# Patient Record
Sex: MALE | Race: WHITE | NOT HISPANIC OR LATINO | Employment: FULL TIME | URBAN - METROPOLITAN AREA
[De-identification: names, ages, dates, MRNs, and addresses within clinical notes are randomized per-mention and may not be internally consistent; named-entity substitution may affect disease eponyms.]

---

## 2023-10-13 ENCOUNTER — ANCILLARY PROCEDURE (OUTPATIENT)
Dept: RADIOLOGY | Facility: CLINIC | Age: 68
End: 2023-10-13
Payer: COMMERCIAL

## 2023-10-13 DIAGNOSIS — E78.5 HYPERLIPIDEMIA, UNSPECIFIED: ICD-10-CM

## 2023-10-13 PROCEDURE — 75571 CT HRT W/O DYE W/CA TEST: CPT

## 2024-02-26 ENCOUNTER — APPOINTMENT (OUTPATIENT)
Dept: RADIOLOGY | Facility: HOSPITAL | Age: 69
End: 2024-02-26
Payer: COMMERCIAL

## 2024-02-26 ENCOUNTER — HOSPITAL ENCOUNTER (OUTPATIENT)
Facility: HOSPITAL | Age: 69
Setting detail: OBSERVATION
LOS: 2 days | Discharge: HOME | End: 2024-02-29
Attending: STUDENT IN AN ORGANIZED HEALTH CARE EDUCATION/TRAINING PROGRAM | Admitting: STUDENT IN AN ORGANIZED HEALTH CARE EDUCATION/TRAINING PROGRAM
Payer: COMMERCIAL

## 2024-02-26 ENCOUNTER — HOSPITAL ENCOUNTER (EMERGENCY)
Facility: HOSPITAL | Age: 69
Discharge: OTHER INSTITUTION | End: 2024-02-26
Attending: EMERGENCY MEDICINE
Payer: COMMERCIAL

## 2024-02-26 VITALS
BODY MASS INDEX: 33.89 KG/M2 | OXYGEN SATURATION: 96 % | WEIGHT: 242.06 LBS | HEIGHT: 71 IN | DIASTOLIC BLOOD PRESSURE: 88 MMHG | RESPIRATION RATE: 18 BRPM | TEMPERATURE: 97.9 F | HEART RATE: 88 BPM | SYSTOLIC BLOOD PRESSURE: 160 MMHG

## 2024-02-26 DIAGNOSIS — J11.1 INFLUENZA: ICD-10-CM

## 2024-02-26 DIAGNOSIS — J98.8 COMPROMISED AIRWAY: ICD-10-CM

## 2024-02-26 DIAGNOSIS — R22.0 SUBMANDIBULAR SWELLING: Primary | ICD-10-CM

## 2024-02-26 DIAGNOSIS — R22.1 SUBMANDIBULAR SWELLING: Primary | ICD-10-CM

## 2024-02-26 DIAGNOSIS — I15.9 SECONDARY HYPERTENSION: ICD-10-CM

## 2024-02-26 LAB
ALBUMIN SERPL BCP-MCNC: 4 G/DL (ref 3.4–5)
ALP SERPL-CCNC: 75 U/L (ref 33–136)
ALT SERPL W P-5'-P-CCNC: 34 U/L (ref 10–52)
ANION GAP BLDV CALCULATED.4IONS-SCNC: 11 MMOL/L (ref 10–25)
ANION GAP SERPL CALC-SCNC: 14 MMOL/L (ref 10–20)
AST SERPL W P-5'-P-CCNC: 24 U/L (ref 9–39)
BASE EXCESS BLDV CALC-SCNC: -0.6 MMOL/L (ref -2–3)
BASOPHILS # BLD AUTO: 0.03 X10*3/UL (ref 0–0.1)
BASOPHILS NFR BLD AUTO: 0.5 %
BILIRUB SERPL-MCNC: 0.7 MG/DL (ref 0–1.2)
BODY TEMPERATURE: 37 DEGREES CELSIUS
BUN SERPL-MCNC: 12 MG/DL (ref 6–23)
CA-I BLDV-SCNC: 1.1 MMOL/L (ref 1.1–1.33)
CALCIUM SERPL-MCNC: 8.7 MG/DL (ref 8.6–10.3)
CHLORIDE BLDV-SCNC: 102 MMOL/L (ref 98–107)
CHLORIDE SERPL-SCNC: 100 MMOL/L (ref 98–107)
CO2 SERPL-SCNC: 27 MMOL/L (ref 21–32)
CREAT SERPL-MCNC: 0.93 MG/DL (ref 0.5–1.3)
EGFRCR SERPLBLD CKD-EPI 2021: >90 ML/MIN/1.73M*2
EOSINOPHIL # BLD AUTO: 0.09 X10*3/UL (ref 0–0.7)
EOSINOPHIL NFR BLD AUTO: 1.4 %
ERYTHROCYTE [DISTWIDTH] IN BLOOD BY AUTOMATED COUNT: 12.5 % (ref 11.5–14.5)
FLUAV RNA RESP QL NAA+PROBE: DETECTED
FLUBV RNA RESP QL NAA+PROBE: NOT DETECTED
GLUCOSE BLDV-MCNC: 161 MG/DL (ref 74–99)
GLUCOSE SERPL-MCNC: 104 MG/DL (ref 74–99)
HCO3 BLDV-SCNC: 24.9 MMOL/L (ref 22–26)
HCT VFR BLD AUTO: 42 % (ref 41–52)
HCT VFR BLD EST: 43 % (ref 41–52)
HGB BLD-MCNC: 14.4 G/DL (ref 13.5–17.5)
HGB BLDV-MCNC: 14.2 G/DL (ref 13.5–17.5)
IMM GRANULOCYTES # BLD AUTO: 0.03 X10*3/UL (ref 0–0.7)
IMM GRANULOCYTES NFR BLD AUTO: 0.5 % (ref 0–0.9)
INHALED O2 CONCENTRATION: 0 %
LACTATE BLDV-SCNC: 1 MMOL/L (ref 0.4–2)
LACTATE SERPL-SCNC: 0.6 MMOL/L (ref 0.4–2)
LYMPHOCYTES # BLD AUTO: 1.14 X10*3/UL (ref 1.2–4.8)
LYMPHOCYTES NFR BLD AUTO: 17.3 %
MCH RBC QN AUTO: 29.9 PG (ref 26–34)
MCHC RBC AUTO-ENTMCNC: 34.3 G/DL (ref 32–36)
MCV RBC AUTO: 87 FL (ref 80–100)
MONOCYTES # BLD AUTO: 0.48 X10*3/UL (ref 0.1–1)
MONOCYTES NFR BLD AUTO: 7.3 %
NEUTROPHILS # BLD AUTO: 4.83 X10*3/UL (ref 1.2–7.7)
NEUTROPHILS NFR BLD AUTO: 73 %
NRBC BLD-RTO: 0 /100 WBCS (ref 0–0)
OXYHGB MFR BLDV: 93.4 % (ref 45–75)
PCO2 BLDV: 43 MM HG (ref 41–51)
PH BLDV: 7.37 PH (ref 7.33–7.43)
PLATELET # BLD AUTO: 196 X10*3/UL (ref 150–450)
PO2 BLDV: 73 MM HG (ref 35–45)
POTASSIUM BLDV-SCNC: 4.7 MMOL/L (ref 3.5–5.3)
POTASSIUM SERPL-SCNC: 4.3 MMOL/L (ref 3.5–5.3)
PROCALCITONIN SERPL-MCNC: 0.04 NG/ML
PROT SERPL-MCNC: 7 G/DL (ref 6.4–8.2)
RBC # BLD AUTO: 4.81 X10*6/UL (ref 4.5–5.9)
RSV RNA RESP QL NAA+PROBE: NOT DETECTED
S PYO DNA THROAT QL NAA+PROBE: NOT DETECTED
SAO2 % BLDV: 96 % (ref 45–75)
SARS-COV-2 RNA RESP QL NAA+PROBE: NOT DETECTED
SODIUM BLDV-SCNC: 133 MMOL/L (ref 136–145)
SODIUM SERPL-SCNC: 137 MMOL/L (ref 136–145)
WBC # BLD AUTO: 6.6 X10*3/UL (ref 4.4–11.3)

## 2024-02-26 PROCEDURE — 87040 BLOOD CULTURE FOR BACTERIA: CPT | Mod: GENLAB | Performed by: EMERGENCY MEDICINE

## 2024-02-26 PROCEDURE — 36415 COLL VENOUS BLD VENIPUNCTURE: CPT

## 2024-02-26 PROCEDURE — 96365 THER/PROPH/DIAG IV INF INIT: CPT | Mod: 59

## 2024-02-26 PROCEDURE — 2500000002 HC RX 250 W HCPCS SELF ADMINISTERED DRUGS (ALT 637 FOR MEDICARE OP, ALT 636 FOR OP/ED): Performed by: STUDENT IN AN ORGANIZED HEALTH CARE EDUCATION/TRAINING PROGRAM

## 2024-02-26 PROCEDURE — 87651 STREP A DNA AMP PROBE: CPT | Performed by: EMERGENCY MEDICINE

## 2024-02-26 PROCEDURE — 2550000001 HC RX 255 CONTRASTS: Performed by: EMERGENCY MEDICINE

## 2024-02-26 PROCEDURE — 99285 EMERGENCY DEPT VISIT HI MDM: CPT | Mod: 25

## 2024-02-26 PROCEDURE — 84145 PROCALCITONIN (PCT): CPT

## 2024-02-26 PROCEDURE — 31575 DIAGNOSTIC LARYNGOSCOPY: CPT | Performed by: OTOLARYNGOLOGY

## 2024-02-26 PROCEDURE — 36415 COLL VENOUS BLD VENIPUNCTURE: CPT | Performed by: STUDENT IN AN ORGANIZED HEALTH CARE EDUCATION/TRAINING PROGRAM

## 2024-02-26 PROCEDURE — 96367 TX/PROPH/DG ADDL SEQ IV INF: CPT

## 2024-02-26 PROCEDURE — 2500000004 HC RX 250 GENERAL PHARMACY W/ HCPCS (ALT 636 FOR OP/ED)

## 2024-02-26 PROCEDURE — 71045 X-RAY EXAM CHEST 1 VIEW: CPT | Performed by: RADIOLOGY

## 2024-02-26 PROCEDURE — C9113 INJ PANTOPRAZOLE SODIUM, VIA: HCPCS | Performed by: STUDENT IN AN ORGANIZED HEALTH CARE EDUCATION/TRAINING PROGRAM

## 2024-02-26 PROCEDURE — 96375 TX/PRO/DX INJ NEW DRUG ADDON: CPT

## 2024-02-26 PROCEDURE — 96375 TX/PRO/DX INJ NEW DRUG ADDON: CPT | Mod: 59

## 2024-02-26 PROCEDURE — 87637 SARSCOV2&INF A&B&RSV AMP PRB: CPT | Performed by: EMERGENCY MEDICINE

## 2024-02-26 PROCEDURE — 36415 COLL VENOUS BLD VENIPUNCTURE: CPT | Performed by: EMERGENCY MEDICINE

## 2024-02-26 PROCEDURE — 2020000001 HC ICU ROOM DAILY

## 2024-02-26 PROCEDURE — 2500000004 HC RX 250 GENERAL PHARMACY W/ HCPCS (ALT 636 FOR OP/ED): Performed by: EMERGENCY MEDICINE

## 2024-02-26 PROCEDURE — 96365 THER/PROPH/DIAG IV INF INIT: CPT

## 2024-02-26 PROCEDURE — 99285 EMERGENCY DEPT VISIT HI MDM: CPT

## 2024-02-26 PROCEDURE — 99285 EMERGENCY DEPT VISIT HI MDM: CPT | Performed by: STUDENT IN AN ORGANIZED HEALTH CARE EDUCATION/TRAINING PROGRAM

## 2024-02-26 PROCEDURE — 85025 COMPLETE CBC W/AUTO DIFF WBC: CPT | Performed by: EMERGENCY MEDICINE

## 2024-02-26 PROCEDURE — 83605 ASSAY OF LACTIC ACID: CPT | Performed by: EMERGENCY MEDICINE

## 2024-02-26 PROCEDURE — 2500000004 HC RX 250 GENERAL PHARMACY W/ HCPCS (ALT 636 FOR OP/ED): Performed by: STUDENT IN AN ORGANIZED HEALTH CARE EDUCATION/TRAINING PROGRAM

## 2024-02-26 PROCEDURE — 87081 CULTURE SCREEN ONLY: CPT

## 2024-02-26 PROCEDURE — 84132 ASSAY OF SERUM POTASSIUM: CPT | Performed by: STUDENT IN AN ORGANIZED HEALTH CARE EDUCATION/TRAINING PROGRAM

## 2024-02-26 PROCEDURE — 70491 CT SOFT TISSUE NECK W/DYE: CPT

## 2024-02-26 PROCEDURE — 70491 CT SOFT TISSUE NECK W/DYE: CPT | Performed by: RADIOLOGY

## 2024-02-26 PROCEDURE — 99221 1ST HOSP IP/OBS SF/LOW 40: CPT | Performed by: OTOLARYNGOLOGY

## 2024-02-26 PROCEDURE — 71045 X-RAY EXAM CHEST 1 VIEW: CPT

## 2024-02-26 PROCEDURE — 80053 COMPREHEN METABOLIC PANEL: CPT | Performed by: EMERGENCY MEDICINE

## 2024-02-26 RX ORDER — FAMOTIDINE 10 MG/ML
20 INJECTION INTRAVENOUS ONCE
Status: COMPLETED | OUTPATIENT
Start: 2024-02-26 | End: 2024-02-26

## 2024-02-26 RX ORDER — LORAZEPAM 2 MG/ML
2 INJECTION INTRAMUSCULAR ONCE
Status: DISCONTINUED | OUTPATIENT
Start: 2024-02-26 | End: 2024-02-26 | Stop reason: HOSPADM

## 2024-02-26 RX ORDER — ENOXAPARIN SODIUM 100 MG/ML
40 INJECTION SUBCUTANEOUS EVERY 24 HOURS
Status: DISCONTINUED | OUTPATIENT
Start: 2024-02-26 | End: 2024-02-29 | Stop reason: HOSPADM

## 2024-02-26 RX ORDER — HYDROMORPHONE HYDROCHLORIDE 1 MG/ML
0.2 INJECTION, SOLUTION INTRAMUSCULAR; INTRAVENOUS; SUBCUTANEOUS EVERY 4 HOURS PRN
Status: DISCONTINUED | OUTPATIENT
Start: 2024-02-26 | End: 2024-02-27

## 2024-02-26 RX ORDER — DIPHENHYDRAMINE HYDROCHLORIDE 50 MG/ML
25 INJECTION INTRAMUSCULAR; INTRAVENOUS EVERY 8 HOURS
Status: COMPLETED | OUTPATIENT
Start: 2024-02-26 | End: 2024-02-27

## 2024-02-26 RX ORDER — ASPIRIN 81 MG/1
81 TABLET ORAL
COMMUNITY
Start: 2020-11-02

## 2024-02-26 RX ORDER — LORAZEPAM 2 MG/ML
2 INJECTION INTRAMUSCULAR ONCE
Status: DISCONTINUED | OUTPATIENT
Start: 2024-02-26 | End: 2024-02-26

## 2024-02-26 RX ORDER — DICLOFENAC SODIUM 10 MG/G
2 GEL TOPICAL 4 TIMES DAILY
COMMUNITY
Start: 2023-10-10

## 2024-02-26 RX ORDER — CEFEPIME 1 G/50ML
2 INJECTION, SOLUTION INTRAVENOUS EVERY 8 HOURS
Status: DISCONTINUED | OUTPATIENT
Start: 2024-02-26 | End: 2024-02-27

## 2024-02-26 RX ORDER — DEXAMETHASONE SODIUM PHOSPHATE 10 MG/ML
10 INJECTION INTRAMUSCULAR; INTRAVENOUS ONCE
Status: COMPLETED | OUTPATIENT
Start: 2024-02-26 | End: 2024-02-26

## 2024-02-26 RX ORDER — CEFEPIME HYDROCHLORIDE 2 G/1
INJECTION, POWDER, FOR SOLUTION INTRAVENOUS
Status: DISCONTINUED
Start: 2024-02-26 | End: 2024-02-26 | Stop reason: HOSPADM

## 2024-02-26 RX ORDER — DEXAMETHASONE SODIUM PHOSPHATE 100 MG/10ML
10 INJECTION INTRAMUSCULAR; INTRAVENOUS EVERY 8 HOURS
Status: DISCONTINUED | OUTPATIENT
Start: 2024-02-26 | End: 2024-02-26

## 2024-02-26 RX ORDER — PANTOPRAZOLE SODIUM 40 MG/10ML
40 INJECTION, POWDER, LYOPHILIZED, FOR SOLUTION INTRAVENOUS DAILY
Status: DISCONTINUED | OUTPATIENT
Start: 2024-02-26 | End: 2024-02-27

## 2024-02-26 RX ORDER — DICLOFENAC SODIUM 10 MG/G
2 GEL TOPICAL 3 TIMES DAILY PRN
Status: DISCONTINUED | OUTPATIENT
Start: 2024-02-26 | End: 2024-02-29 | Stop reason: HOSPADM

## 2024-02-26 RX ORDER — METRONIDAZOLE 500 MG/100ML
500 INJECTION, SOLUTION INTRAVENOUS EVERY 8 HOURS
Status: DISCONTINUED | OUTPATIENT
Start: 2024-02-26 | End: 2024-02-27

## 2024-02-26 RX ORDER — METRONIDAZOLE 500 MG/100ML
500 INJECTION, SOLUTION INTRAVENOUS ONCE
Status: COMPLETED | OUTPATIENT
Start: 2024-02-26 | End: 2024-02-26

## 2024-02-26 RX ORDER — FAMOTIDINE 10 MG/ML
INJECTION INTRAVENOUS
Status: COMPLETED
Start: 2024-02-26 | End: 2024-02-26

## 2024-02-26 RX ORDER — OXYCODONE HYDROCHLORIDE 10 MG/1
10 TABLET ORAL EVERY 8 HOURS PRN
COMMUNITY
Start: 2024-02-09 | End: 2024-05-08

## 2024-02-26 RX ORDER — OSELTAMIVIR PHOSPHATE 75 MG/1
75 CAPSULE ORAL ONCE
Status: COMPLETED | OUTPATIENT
Start: 2024-02-26 | End: 2024-02-26

## 2024-02-26 RX ORDER — ASPIRIN 81 MG/1
81 TABLET ORAL
Status: DISCONTINUED | OUTPATIENT
Start: 2024-02-27 | End: 2024-02-29 | Stop reason: HOSPADM

## 2024-02-26 RX ORDER — OSELTAMIVIR PHOSPHATE 75 MG/1
75 CAPSULE ORAL DAILY
Status: DISCONTINUED | OUTPATIENT
Start: 2024-02-27 | End: 2024-02-27

## 2024-02-26 RX ORDER — DEXAMETHASONE SODIUM PHOSPHATE 4 MG/ML
4 INJECTION, SOLUTION INTRA-ARTICULAR; INTRALESIONAL; INTRAMUSCULAR; INTRAVENOUS; SOFT TISSUE EVERY 8 HOURS
Status: DISCONTINUED | OUTPATIENT
Start: 2024-02-26 | End: 2024-02-27

## 2024-02-26 RX ORDER — OXYCODONE HYDROCHLORIDE 5 MG/1
5 TABLET ORAL EVERY 6 HOURS PRN
Status: DISCONTINUED | OUTPATIENT
Start: 2024-02-26 | End: 2024-02-26

## 2024-02-26 RX ORDER — ONDANSETRON HYDROCHLORIDE 2 MG/ML
INJECTION, SOLUTION INTRAVENOUS
Status: COMPLETED
Start: 2024-02-26 | End: 2024-02-26

## 2024-02-26 RX ORDER — HYDROMORPHONE HYDROCHLORIDE 1 MG/ML
1 INJECTION, SOLUTION INTRAMUSCULAR; INTRAVENOUS; SUBCUTANEOUS ONCE
Status: COMPLETED | OUTPATIENT
Start: 2024-02-26 | End: 2024-02-26

## 2024-02-26 RX ORDER — ONDANSETRON HYDROCHLORIDE 2 MG/ML
4 INJECTION, SOLUTION INTRAVENOUS ONCE
Status: COMPLETED | OUTPATIENT
Start: 2024-02-26 | End: 2024-02-26

## 2024-02-26 RX ORDER — DIPHENHYDRAMINE HYDROCHLORIDE 50 MG/ML
25 INJECTION INTRAMUSCULAR; INTRAVENOUS ONCE
Status: COMPLETED | OUTPATIENT
Start: 2024-02-26 | End: 2024-02-26

## 2024-02-26 RX ORDER — FAMOTIDINE 10 MG/ML
20 INJECTION INTRAVENOUS EVERY 12 HOURS SCHEDULED
Status: DISCONTINUED | OUTPATIENT
Start: 2024-02-26 | End: 2024-02-27

## 2024-02-26 RX ORDER — CALC/MAG/B COMPLEX/D3/HERB 61
1 TABLET ORAL
COMMUNITY
Start: 2008-11-06

## 2024-02-26 RX ORDER — MELOXICAM 15 MG/1
15 TABLET ORAL
COMMUNITY
Start: 2024-02-07

## 2024-02-26 RX ORDER — LISINOPRIL AND HYDROCHLOROTHIAZIDE 10; 12.5 MG/1; MG/1
1 TABLET ORAL DAILY
COMMUNITY
End: 2024-02-29 | Stop reason: HOSPADM

## 2024-02-26 RX ADMIN — FAMOTIDINE 20 MG: 10 INJECTION, SOLUTION INTRAVENOUS at 13:40

## 2024-02-26 RX ADMIN — FAMOTIDINE 20 MG: 10 INJECTION INTRAVENOUS at 21:31

## 2024-02-26 RX ADMIN — ONDANSETRON 4 MG: 2 INJECTION, SOLUTION INTRAMUSCULAR; INTRAVENOUS at 13:40

## 2024-02-26 RX ADMIN — DIPHENHYDRAMINE HYDROCHLORIDE 25 MG: 50 INJECTION INTRAMUSCULAR; INTRAVENOUS at 21:31

## 2024-02-26 RX ADMIN — PANTOPRAZOLE SODIUM 40 MG: 40 INJECTION, POWDER, FOR SOLUTION INTRAVENOUS at 20:03

## 2024-02-26 RX ADMIN — DEXAMETHASONE SODIUM PHOSPHATE 4 MG: 4 INJECTION, SOLUTION INTRAMUSCULAR; INTRAVENOUS at 19:36

## 2024-02-26 RX ADMIN — FAMOTIDINE 20 MG: 10 INJECTION INTRAVENOUS at 13:40

## 2024-02-26 RX ADMIN — DEXAMETHASONE SODIUM PHOSPHATE 10 MG: 10 INJECTION, SOLUTION INTRAMUSCULAR; INTRAVENOUS at 11:40

## 2024-02-26 RX ADMIN — HYDROMORPHONE HYDROCHLORIDE 0.2 MG: 1 INJECTION, SOLUTION INTRAMUSCULAR; INTRAVENOUS; SUBCUTANEOUS at 19:36

## 2024-02-26 RX ADMIN — IOHEXOL 75 ML: 350 INJECTION, SOLUTION INTRAVENOUS at 09:49

## 2024-02-26 RX ADMIN — HYDROMORPHONE HYDROCHLORIDE 1 MG: 1 INJECTION, SOLUTION INTRAMUSCULAR; INTRAVENOUS; SUBCUTANEOUS at 14:15

## 2024-02-26 RX ADMIN — METRONIDAZOLE 500 MG: 500 INJECTION, SOLUTION INTRAVENOUS at 10:15

## 2024-02-26 RX ADMIN — DIPHENHYDRAMINE HYDROCHLORIDE 25 MG: 50 INJECTION INTRAMUSCULAR; INTRAVENOUS at 11:40

## 2024-02-26 RX ADMIN — SODIUM CHLORIDE 1000 ML: 9 INJECTION, SOLUTION INTRAVENOUS at 09:35

## 2024-02-26 RX ADMIN — ONDANSETRON HYDROCHLORIDE 4 MG: 2 INJECTION, SOLUTION INTRAVENOUS at 13:40

## 2024-02-26 RX ADMIN — METRONIDAZOLE 500 MG: 500 INJECTION, SOLUTION INTRAVENOUS at 18:16

## 2024-02-26 RX ADMIN — CEFEPIME HYDROCHLORIDE 2 G: 2 INJECTION, POWDER, FOR SOLUTION INTRAVENOUS at 09:45

## 2024-02-26 RX ADMIN — LORAZEPAM 2 MG: 2 INJECTION, SOLUTION INTRAMUSCULAR; INTRAVENOUS at 14:05

## 2024-02-26 RX ADMIN — OSELTAMIVIR PHOSPHATE 75 MG: 75 CAPSULE ORAL at 16:40

## 2024-02-26 RX ADMIN — CEFEPIME 2 G: 1 INJECTION, SOLUTION INTRAVENOUS at 18:16

## 2024-02-26 RX ADMIN — ENOXAPARIN SODIUM 40 MG: 100 INJECTION SUBCUTANEOUS at 20:03

## 2024-02-26 SDOH — SOCIAL STABILITY: SOCIAL INSECURITY: DOES ANYONE TRY TO KEEP YOU FROM HAVING/CONTACTING OTHER FRIENDS OR DOING THINGS OUTSIDE YOUR HOME?: NO

## 2024-02-26 SDOH — SOCIAL STABILITY: SOCIAL INSECURITY: HAS ANYONE EVER THREATENED TO HURT YOUR FAMILY OR YOUR PETS?: NO

## 2024-02-26 SDOH — SOCIAL STABILITY: SOCIAL INSECURITY: ARE THERE ANY APPARENT SIGNS OF INJURIES/BEHAVIORS THAT COULD BE RELATED TO ABUSE/NEGLECT?: NO

## 2024-02-26 SDOH — SOCIAL STABILITY: SOCIAL INSECURITY: HAVE YOU HAD THOUGHTS OF HARMING ANYONE ELSE?: NO

## 2024-02-26 SDOH — SOCIAL STABILITY: SOCIAL INSECURITY: ARE YOU OR HAVE YOU BEEN THREATENED OR ABUSED PHYSICALLY, EMOTIONALLY, OR SEXUALLY BY ANYONE?: NO

## 2024-02-26 SDOH — SOCIAL STABILITY: SOCIAL INSECURITY: ABUSE: ADULT

## 2024-02-26 SDOH — SOCIAL STABILITY: SOCIAL INSECURITY: DO YOU FEEL ANYONE HAS EXPLOITED OR TAKEN ADVANTAGE OF YOU FINANCIALLY OR OF YOUR PERSONAL PROPERTY?: NO

## 2024-02-26 SDOH — SOCIAL STABILITY: SOCIAL INSECURITY: DO YOU FEEL UNSAFE GOING BACK TO THE PLACE WHERE YOU ARE LIVING?: NO

## 2024-02-26 ASSESSMENT — COGNITIVE AND FUNCTIONAL STATUS - GENERAL
DAILY ACTIVITIY SCORE: 24
PATIENT BASELINE BEDBOUND: NO
MOBILITY SCORE: 24
MOBILITY SCORE: 24
DAILY ACTIVITIY SCORE: 24

## 2024-02-26 ASSESSMENT — LIFESTYLE VARIABLES
HOW MANY STANDARD DRINKS CONTAINING ALCOHOL DO YOU HAVE ON A TYPICAL DAY: PATIENT DOES NOT DRINK
EVER FELT BAD OR GUILTY ABOUT YOUR DRINKING: NO
HAVE PEOPLE ANNOYED YOU BY CRITICIZING YOUR DRINKING: NO
SUBSTANCE_ABUSE_PAST_12_MONTHS: NO
AUDIT-C TOTAL SCORE: 0
HOW OFTEN DO YOU HAVE A DRINK CONTAINING ALCOHOL: NEVER
HOW OFTEN DO YOU HAVE 6 OR MORE DRINKS ON ONE OCCASION: NEVER
AUDIT-C TOTAL SCORE: 0
PRESCIPTION_ABUSE_PAST_12_MONTHS: NO
HAVE YOU EVER FELT YOU SHOULD CUT DOWN ON YOUR DRINKING: NO
SKIP TO QUESTIONS 9-10: 1
EVER HAD A DRINK FIRST THING IN THE MORNING TO STEADY YOUR NERVES TO GET RID OF A HANGOVER: NO

## 2024-02-26 ASSESSMENT — COLUMBIA-SUICIDE SEVERITY RATING SCALE - C-SSRS
6. HAVE YOU EVER DONE ANYTHING, STARTED TO DO ANYTHING, OR PREPARED TO DO ANYTHING TO END YOUR LIFE?: NO
6. HAVE YOU EVER DONE ANYTHING, STARTED TO DO ANYTHING, OR PREPARED TO DO ANYTHING TO END YOUR LIFE?: NO
2. HAVE YOU ACTUALLY HAD ANY THOUGHTS OF KILLING YOURSELF?: NO
2. HAVE YOU ACTUALLY HAD ANY THOUGHTS OF KILLING YOURSELF?: NO
1. IN THE PAST MONTH, HAVE YOU WISHED YOU WERE DEAD OR WISHED YOU COULD GO TO SLEEP AND NOT WAKE UP?: NO
6. HAVE YOU EVER DONE ANYTHING, STARTED TO DO ANYTHING, OR PREPARED TO DO ANYTHING TO END YOUR LIFE?: NO
2. HAVE YOU ACTUALLY HAD ANY THOUGHTS OF KILLING YOURSELF?: NO

## 2024-02-26 ASSESSMENT — PAIN SCALES - GENERAL
PAINLEVEL_OUTOF10: 7
PAINLEVEL_OUTOF10: 0 - NO PAIN
PAINLEVEL_OUTOF10: 0 - NO PAIN
PAINLEVEL_OUTOF10: 9

## 2024-02-26 ASSESSMENT — ACTIVITIES OF DAILY LIVING (ADL)
DRESSING YOURSELF: INDEPENDENT
FEEDING YOURSELF: INDEPENDENT
JUDGMENT_ADEQUATE_SAFELY_COMPLETE_DAILY_ACTIVITIES: YES
ADEQUATE_TO_COMPLETE_ADL: YES
HEARING - LEFT EAR: FUNCTIONAL
BATHING: INDEPENDENT
PATIENT'S MEMORY ADEQUATE TO SAFELY COMPLETE DAILY ACTIVITIES?: YES
LACK_OF_TRANSPORTATION: NO
HEARING - RIGHT EAR: FUNCTIONAL
WALKS IN HOME: INDEPENDENT
TOILETING: INDEPENDENT
GROOMING: INDEPENDENT

## 2024-02-26 ASSESSMENT — PAIN DESCRIPTION - LOCATION
LOCATION: THROAT
LOCATION: BACK

## 2024-02-26 ASSESSMENT — PAIN DESCRIPTION - ONSET: ONSET: SUDDEN

## 2024-02-26 ASSESSMENT — PAIN DESCRIPTION - ORIENTATION: ORIENTATION: RIGHT;LEFT;INNER;OUTER

## 2024-02-26 ASSESSMENT — PAIN - FUNCTIONAL ASSESSMENT
PAIN_FUNCTIONAL_ASSESSMENT: 0-10

## 2024-02-26 ASSESSMENT — PAIN DESCRIPTION - PROGRESSION: CLINICAL_PROGRESSION: GRADUALLY WORSENING

## 2024-02-26 ASSESSMENT — PAIN DESCRIPTION - FREQUENCY: FREQUENCY: CONSTANT/CONTINUOUS

## 2024-02-26 ASSESSMENT — PATIENT HEALTH QUESTIONNAIRE - PHQ9
1. LITTLE INTEREST OR PLEASURE IN DOING THINGS: NOT AT ALL
SUM OF ALL RESPONSES TO PHQ9 QUESTIONS 1 & 2: 0
2. FEELING DOWN, DEPRESSED OR HOPELESS: NOT AT ALL

## 2024-02-26 ASSESSMENT — PAIN DESCRIPTION - PAIN TYPE: TYPE: ACUTE PAIN

## 2024-02-26 NOTE — CONSULTS
Consults    Reason For Consult  Acute onset airway edema    Consulted by:  ED    History Of Present Illness  Alvaro Christensen is a 68 y.o. male with PMH of HTN presenting with acute onset throat swelling and pain.    Pt presented to OSH ED this morning with swelling in his neck which was sudden in onset. Associated with throat pain, cough, nasal congestion and generalised fatigue. Tested positive for influenza. CT imagine of the neck at OSH showed extensive edema of the subcut tis, b/l submandibular and submental spaces with extension to the larynx resulting in airway narrowing. Likely d/t reactive sialoadenitis.    Pt denies any difficulty breathing, wheezing, difficulty swallowing, fever, chills, nausea, vomiting, chest pain or abdominal pain.     ROS:  A full review of systems was obtained and all other systems are negative for complaint    Past Medical History  Past Medical History:   Diagnosis Date    Hypertension        Surgical History  Past Surgical History:   Procedure Laterality Date    OTHER SURGICAL HISTORY  11/02/2020    Back surgery    OTHER SURGICAL HISTORY  11/02/2020    Hip surgery     Social History  Social History     Socioeconomic History    Marital status:      Spouse name: None    Number of children: None    Years of education: None    Highest education level: None   Occupational History    None   Tobacco Use    Smoking status: Former     Types: Cigarettes     Quit date: 2/2/2014     Years since quitting: 10.0     Passive exposure: Past    Smokeless tobacco: Never   Vaping Use    Vaping Use: Never used   Substance and Sexual Activity    Alcohol use: Not Currently    Drug use: Never    Sexual activity: None   Other Topics Concern    None   Social History Narrative    None     Social Determinants of Health     Financial Resource Strain: Not on file   Food Insecurity: Not on file   Transportation Needs: Not on file   Physical Activity: Not on file   Stress: Not on file   Social  "Connections: Not on file   Intimate Partner Violence: Not on file   Housing Stability: Not on file       Family History  No family history on file.     Allergies  Allergies   Allergen Reactions    Penicillins Anaphylaxis and Other    Sulfa (Sulfonamide Antibiotics) Anaphylaxis     Medications:  No current facility-administered medications on file prior to encounter.     Current Outpatient Medications on File Prior to Encounter   Medication Sig Dispense Refill    aspirin 81 mg EC tablet Take 1 tablet (81 mg) by mouth once daily.      diclofenac sodium (Voltaren) 1 % gel Apply 2.25 inches (2 g) topically 4 times a day.      lansoprazole (Prevacid) 30 mg DR capsule Take 1 capsule (30 mg) by mouth once daily in the morning. Take before meals.      meloxicam (Mobic) 15 mg tablet Take 1 tablet (15 mg) by mouth once daily.      oxyCODONE (Roxicodone) 10 mg immediate release tablet Take 1 tablet (10 mg) by mouth every 8 hours if needed for severe pain (7 - 10) or moderate pain (4 - 6).      lisinopriL-hydrochlorothiazide 10-12.5 mg tablet Take 1 tablet by mouth once daily.         Review of Systems     Physical Exam     Last Recorded Vitals  Blood pressure 160/88, pulse 88, temperature 36.6 °C (97.9 °F), resp. rate 18, height 1.803 m (5' 11\"), weight 110 kg (242 lb 1 oz), SpO2 96 %.    Physical Exam:  CONSTITUTIONAL:  No acute distress  VOICE:  No hoarseness or other abnormality  RESPIRATION:  Breathing comfortably, no stridor  CV:  No clubbing/cyanosis/edema in hands  EYES:  EOM intact, sclera normal  NEURO:  Alert and oriented times 3, Cranial nerves II-XII grossly intact and symmetric bilaterally  HEAD AND FACE:  Symmetric facial features, no masses or lesions, sinuses non-tender to palpation  SALIVARY GLANDS:  Parotid and submandibular glands normal bilaterally  EARS:  Normal external ears, external auditory canals, and TMs to otoscopy, normal hearing to whispered voice.  NOSE:  External nose midline, anterior " rhinoscopy is normal with limited visualization to the anterior aspect of the interior turbinates, no bleeding or drainage, no lesions  ORAL CAVITY/OROPHARYNX/LIPS:  Normal mucous membranes, normal floor of mouth/tongue/OP, no masses or lesions  PHARYNGEAL WALLS:  Edema  NECK/LYMPH:  No LAD, no thyroid masses, trachea midline  SKIN:  Neck skin is without scar or injury  PSYCH:  Alert and oriented with appropriate mood and affect    Relevant Results  Results for orders placed or performed during the hospital encounter of 02/26/24 (from the past 96 hour(s))   Sars-CoV-2 and Influenza A/B PCR   Result Value Ref Range    Flu A Result Detected (A) Not Detected    Flu B Result Not Detected Not Detected    Coronavirus 2019, PCR Not Detected Not Detected   RSV PCR   Result Value Ref Range    RSV PCR Not Detected Not Detected   Group A Streptococcus, PCR    Specimen: Throat/Pharynx; Swab   Result Value Ref Range    Group A Strep PCR Not Detected Not Detected   CBC and Auto Differential   Result Value Ref Range    WBC 6.6 4.4 - 11.3 x10*3/uL    nRBC 0.0 0.0 - 0.0 /100 WBCs    RBC 4.81 4.50 - 5.90 x10*6/uL    Hemoglobin 14.4 13.5 - 17.5 g/dL    Hematocrit 42.0 41.0 - 52.0 %    MCV 87 80 - 100 fL    MCH 29.9 26.0 - 34.0 pg    MCHC 34.3 32.0 - 36.0 g/dL    RDW 12.5 11.5 - 14.5 %    Platelets 196 150 - 450 x10*3/uL    Neutrophils % 73.0 40.0 - 80.0 %    Immature Granulocytes %, Automated 0.5 0.0 - 0.9 %    Lymphocytes % 17.3 13.0 - 44.0 %    Monocytes % 7.3 2.0 - 10.0 %    Eosinophils % 1.4 0.0 - 6.0 %    Basophils % 0.5 0.0 - 2.0 %    Neutrophils Absolute 4.83 1.20 - 7.70 x10*3/uL    Immature Granulocytes Absolute, Automated 0.03 0.00 - 0.70 x10*3/uL    Lymphocytes Absolute 1.14 (L) 1.20 - 4.80 x10*3/uL    Monocytes Absolute 0.48 0.10 - 1.00 x10*3/uL    Eosinophils Absolute 0.09 0.00 - 0.70 x10*3/uL    Basophils Absolute 0.03 0.00 - 0.10 x10*3/uL   Comprehensive Metabolic Panel   Result Value Ref Range    Glucose 104 (H) 74 - 99  mg/dL    Sodium 137 136 - 145 mmol/L    Potassium 4.3 3.5 - 5.3 mmol/L    Chloride 100 98 - 107 mmol/L    Bicarbonate 27 21 - 32 mmol/L    Anion Gap 14 10 - 20 mmol/L    Urea Nitrogen 12 6 - 23 mg/dL    Creatinine 0.93 0.50 - 1.30 mg/dL    eGFR >90 >60 mL/min/1.73m*2    Calcium 8.7 8.6 - 10.3 mg/dL    Albumin 4.0 3.4 - 5.0 g/dL    Alkaline Phosphatase 75 33 - 136 U/L    Total Protein 7.0 6.4 - 8.2 g/dL    AST 24 9 - 39 U/L    Bilirubin, Total 0.7 0.0 - 1.2 mg/dL    ALT 34 10 - 52 U/L   Lactate   Result Value Ref Range    Lactate 0.6 0.4 - 2.0 mmol/L     CT soft tissue neck w IV contrast    Result Date: 2/26/2024  FINDINGS: There is marked swelling and interstitial edematous changes of the bilateral submandibular glands. Additionally there is submucosal edema and thickening throughout the oropharyngeal and supraglottic laryngeal soft tissues with narrowing of the oropharynx and supraglottic larynx. Inflammatory changes extend into the adjacent fat in the submandibular region and subcutaneous fat overlying the platysmas. The bilateral parotid glands are unremarkable   The patient lacks maxillary teeth and mandibular molar/premolars.   The prevertebral and retropharyngeal soft tissues are within normal limits without fluid collection.   No lymphadenopathy in the neck.   The neck vessels are patent and normal in caliber. Atherosclerotic calcifications of the visualized portion of the aortic arch.   The thyroid gland is unremarkable.   Multilevel degenerative changes of the cervical spine, most pronounced at C5-C6 where posterior disc osteophyte complex moderately narrows the spinal canal.   The visualized upper lungs demonstrate mild centrilobular emphysematous changes.       Bilateral submandibular sialadenitis of undetermined etiology. No associated sialolith.   Extensive edema within the subcutaneous fat and bilateral submandibular/submental spaces with extension into the mucosa of the oropharynx and supraglottic  larynx. Notably the supraglottic mucosal edema results in narrowing of the airway. Findings are likely reactive to bilateral sialoadenitis. Differential also includes angioedema but is thought to be less likely.          Assessment/Plan     Alvaro Christensen is a 68 y.o. male with PMH of HTN presenting with acute onset throat swelling and pain.            Jolene Garrison MD  Dept. of Otolaryngology - Head and Neck Surgery, PGY-2  ENT Adult: [id number]  ENT Peds: [id number]  ENT Outpatient scheduling number: [phone]

## 2024-02-26 NOTE — ED PROVIDER NOTES
HPI   Chief Complaint   Patient presents with    Oral Swelling     Throat pain 7/10 jaw and throat swelling bilateral started last night and when he woke up this morning his jaw was swollen denied any injury or trauma he has had a cough x 5 days       HPI                    [place] Coma Scale Score: 15                     Patient History   Past Medical History:   Diagnosis Date    Hypertension      Past Surgical History:   Procedure Laterality Date    OTHER SURGICAL HISTORY  11/02/2020    Back surgery    OTHER SURGICAL HISTORY  11/02/2020    Hip surgery     No family history on file.  Social History     Tobacco Use    Smoking status: Former     Types: Cigarettes     Quit date: 2/2/2014     Years since quitting: 10.0     Passive exposure: Past    Smokeless tobacco: Never   Vaping Use    Vaping Use: Never used   Substance Use Topics    Alcohol use: Not Currently    Drug use: Never       Physical Exam   ED Triage Vitals [02/26/24 0830]   Temperature Heart Rate Respirations BP   36.6 °C (97.8 °F) 90 18 (!) 131/94      Pulse Ox Temp Source Heart Rate Source Patient Position   96 % Temporal Monitor Sitting      BP Location FiO2 (%)     Left arm --       Physical Exam  Vitals and nursing note reviewed.   Constitutional:       General: He is not in acute distress.     Appearance: He is well-developed.   HENT:      Head: Normocephalic and atraumatic. Mass present.      Jaw: Tenderness present.      Salivary Glands: Right salivary gland is diffusely enlarged and tender. Left salivary gland is diffusely enlarged and tender.     Eyes:      Conjunctiva/sclera: Conjunctivae normal.   Cardiovascular:      Rate and Rhythm: Normal rate and regular rhythm.      Heart sounds: No murmur heard.  Pulmonary:      Effort: Pulmonary effort is normal. No respiratory distress.      Breath sounds: Normal breath sounds.   Abdominal:      Palpations: Abdomen is soft.      Tenderness: There is no abdominal tenderness.   Musculoskeletal:          General: No swelling.      Cervical back: Neck supple.   Skin:     General: Skin is warm and dry.      Capillary Refill: Capillary refill takes less than 2 seconds.   Neurological:      Mental Status: He is alert.   Psychiatric:         Mood and Affect: Mood normal.         ED Course & MDM   Diagnoses as of 02/26/24 1322   Submandibular swelling   Compromised airway       Medical Decision Making  68-year-old male presents to the ER with chief complaint of generalized weakness fatigue cough congestion achiness.  Patient tested positive for influenza.  However patient reports around 4 AM he started having some swelling in his neck that came on fairly suddenly according to the patient.  They waited a few hours came to the ED.  At this time patient does not have airway compromise but worried about Esdras's I did perform a CT start the patient on antibiotics and steroids right away.  CT shows enlarged/further glands bilaterally fairly large amount of edema.  Reach out to ENT they can recommend observing the patient.  However reassessed the patient at that point he says getting progressively worse so at that point immediately contacted [facility]NT to get the patient emergently transferred.  At the time patient is to not compromise his airway however is a high potential on CT does show narrowing airway and that it is compromised but clinically the patient has a normal voice he is well-appearing no acute distress at this time.  However he does a great potential of him having a difficult airway.  For this reason we will reach out for an helicopter to get the patient emergently transferred quickly as possible.  Again the ground transport could be greater than an hour of transport on the ground I do not feel that this patient would be safe by going by ground I believe that to best serve him and not have a poor outcome or even lead to death he should be transferred to the helicopter.  For this reason did ask talk to the  helicopter crew who will be coming to see the patient.  Discussed with the ED team and also ENT at main campus.  Patient be transferred there for definitive care.        Procedure  Procedures     Walter Palencia, DO  02/26/24 1321

## 2024-02-26 NOTE — ED PROVIDER NOTES
HPI   Chief Complaint   Patient presents with    Oral Swelling     Angioedema       This is a male patient, former smoker who presents to the ED brought in by helicopter EMS as a transfer from [facility] for submandibular area swelling.  His symptoms started as cough, sore throat over the last 5 days, which and his significant neck pain and swelling today.  At outside hospital emergency department, there was concern for airway and Chavo's angina, at outside hospital laboratory studies were grossly unremarkable he was noted to be flu positive, CT scan of the neck with IV contrast did demonstrate bilateral submandibular sialoadenitis with extensive edema within subcutaneous fat and bilateral submandibular and submental spaces, airway narrowing.  At the time of exam, patient is comfortable appearing, denies any significant pain, does report some pain with extension of the neck.                          [place] Coma Scale Score: 15                     Patient History   Past Medical History:   Diagnosis Date    Hypertension      Past Surgical History:   Procedure Laterality Date    OTHER SURGICAL HISTORY  11/02/2020    Back surgery    OTHER SURGICAL HISTORY  11/02/2020    Hip surgery     No family history on file.  Social History     Tobacco Use    Smoking status: Former     Types: Cigarettes     Quit date: 2/2/2014     Years since quitting: 10.0     Passive exposure: Past    Smokeless tobacco: Never   Vaping Use    Vaping Use: Never used   Substance Use Topics    Alcohol use: Not Currently    Drug use: Never       Physical Exam   ED Triage Vitals [02/26/24 1445]   Temperature Heart Rate Respirations BP   36.3 °C (97.3 °F) (!) 102 16 131/86      Pulse Ox Temp src Heart Rate Source Patient Position   (!) 92 % -- -- --      BP Location FiO2 (%)     -- 28 %       Physical Exam  Constitutional:       Appearance: Normal appearance.   HENT:      Head: Normocephalic and atraumatic.      Mouth/Throat:      Mouth: Mucous  membranes are moist.      Comments: Submandibular tissues are significantly hard, however his neck is supple, there is no trismus, there is mild tenderness to palpation of the submandibular region, there is symmetric swelling bilaterally, the uvula is difficult to visualize due to swelling however.  Eyes:      Extraocular Movements: Extraocular movements intact.   Cardiovascular:      Rate and Rhythm: Normal rate and regular rhythm.      Heart sounds: Normal heart sounds. No murmur heard.  Pulmonary:      Effort: Pulmonary effort is normal. No respiratory distress.      Breath sounds: Normal breath sounds. No wheezing.   Abdominal:      General: There is no distension.      Palpations: Abdomen is soft.      Tenderness: There is no abdominal tenderness. There is no guarding.   Musculoskeletal:      Right lower leg: No edema.      Left lower leg: No edema.   Skin:     General: Skin is warm and dry.   Neurological:      General: No focal deficit present.      Mental Status: He is alert and oriented to person, place, and time.   Psychiatric:         Mood and Affect: Mood normal.         Behavior: Behavior normal.         ED Course & MDM   ED Course as of 02/27/24 1016   Mon Feb 26, 2024 1557 I reevaluated patient at bedside here.  The patient is currently protecting his airway.  Patient does have Mallampati 4.  He is currently on nasal cannula oxygen.  With his flu a positive status, may start Tamiflu.  Will see if we can wean oxygen. [WJ]      ED Course User Index  [WJ] Abraham Almaraz DO         Diagnoses as of 02/27/24 1016   Submandibular swelling       Medical Decision Making  Minimally tachycardic on arrival to the ED, but he is not in any significant distress.  Need to get antibiotics and steroids at outside hospital, last dose of Decadron 10 mg was 11 AM.  He is maintaining his airway, ENT called to bedside, the patient's nasal and oropharynx was scope, demonstrating some airway edema but intact airway.  They  did not recommend antibiotics when I consulted them regarding this.  However they did recommend airway watch, Decadron 10 mg IV every 8 hours.  At this time, he does not appear to have airway compromise, but we will monitor closely.    Case discussed with medical ICU attending and patient is accepted for admission to medical ICU    Discussed with the attending  Alejandro Munoz DO PGY-4  Emergency Medicine        Procedure  Procedures     Alejandro Munoz DO  Resident  02/27/24 1016

## 2024-02-26 NOTE — ED NOTES
Pt brought back in from helicopter having a panic attack was given IV medication per verbal orders from Dr Palencia 2 mg IV ativan and 1 mg IV dilaudid pt tolerated well at 1420 pt wheeled back to helicopter and was able to be transported via flight safety maintained     Amy Cisneros RN  02/26/24 5002

## 2024-02-26 NOTE — ED NOTES
Pt came in from home via private vehicle he lives independently w/ wife AO x4 states he has had a productive cough x 5 days last states last night his throat was feeling sore woke up this morning and hi bilateral jaw is swollen voice is hoarse 7/10 pain denied any injury trauma SOB CP N/V/D constipation fever or urinary symptoms wife at bedside pt is a former smoker quit 10 years ago call bell w/in reach safety maintained     Amy Cisneros RN  02/26/24 7002

## 2024-02-26 NOTE — H&P
History Of Present Illness  Alvaro Christensen is a 68 y.o. male with PMH of HTN, Chronic back pain s/p bilateral SIJ  injections presenting with submandibular area swelling.  Initially presented to [facility] with throat pain and swelling that started at around 4 AM.  For the prior 5 days patient has been complaining of generalized weakness, fatigue, cough and malaise.  Patient was started on IV antibiotics and steroids at [place] and ENT was consulted.  Interval exam showed increased opening and ENT recommended transport to  main campus.  Labs at [facility] were notable for positive influenza white count of 6.6 otherwise normal. CT neck showed Extensive edema within the subcutaneous fat and bilateral submandibular/submental spaces with extension into the mucosa of the oropharynx and supraglottic larynx. Notably the supraglottic mucosal edema results in narrowing of the airway. Findings are likely reactive to bilateral sialoadenitis. Differential also includes angioedema but is thought to be less likely.       On arrival to [facility], vitals 131/86, , SpO2 92% on nasal cannula, temp 97.3 F, RR 16  and was sleepy 2/2 sedatives given during air transport.  ENT at [facility] examined him by Flexible Nasolaryngoscopy and reported diffuse supraglottic watery edema  ,concentric collapse of supraglottics/oropharynx, vocal cords symmetrically mobile, no gross upper airway obstruction, minimal secretion burden and no tumors or masses. They recommend airway monitoring, IV steroids, and  no need for IV abx.    On arrival to MICU: Patient still sleepy Aox4 satting well on 2l NC, breathing well with no audible stridor or wheezing.      ED Course:    Vitals:  BP: 131/94  HR 90  RR: 18  SpO2: 96%    Recent Labs  Results for orders placed or performed during the hospital encounter of 02/26/24 (from the past 24 hour(s))   Sars-CoV-2 and Influenza A/B PCR   Result Value Ref Range    Flu A Result Detected (A) Not Detected    Flu B Result  Not Detected Not Detected    Coronavirus 2019, PCR Not Detected Not Detected   RSV PCR   Result Value Ref Range    RSV PCR Not Detected Not Detected   Group A Streptococcus, PCR    Specimen: Throat/Pharynx; Swab   Result Value Ref Range    Group A Strep PCR Not Detected Not Detected   CBC and Auto Differential   Result Value Ref Range    WBC 6.6 4.4 - 11.3 x10*3/uL    nRBC 0.0 0.0 - 0.0 /100 WBCs    RBC 4.81 4.50 - 5.90 x10*6/uL    Hemoglobin 14.4 13.5 - 17.5 g/dL    Hematocrit 42.0 41.0 - 52.0 %    MCV 87 80 - 100 fL    MCH 29.9 26.0 - 34.0 pg    MCHC 34.3 32.0 - 36.0 g/dL    RDW 12.5 11.5 - 14.5 %    Platelets 196 150 - 450 x10*3/uL    Neutrophils % 73.0 40.0 - 80.0 %    Immature Granulocytes %, Automated 0.5 0.0 - 0.9 %    Lymphocytes % 17.3 13.0 - 44.0 %    Monocytes % 7.3 2.0 - 10.0 %    Eosinophils % 1.4 0.0 - 6.0 %    Basophils % 0.5 0.0 - 2.0 %    Neutrophils Absolute 4.83 1.20 - 7.70 x10*3/uL    Immature Granulocytes Absolute, Automated 0.03 0.00 - 0.70 x10*3/uL    Lymphocytes Absolute 1.14 (L) 1.20 - 4.80 x10*3/uL    Monocytes Absolute 0.48 0.10 - 1.00 x10*3/uL    Eosinophils Absolute 0.09 0.00 - 0.70 x10*3/uL    Basophils Absolute 0.03 0.00 - 0.10 x10*3/uL   Comprehensive Metabolic Panel   Result Value Ref Range    Glucose 104 (H) 74 - 99 mg/dL    Sodium 137 136 - 145 mmol/L    Potassium 4.3 3.5 - 5.3 mmol/L    Chloride 100 98 - 107 mmol/L    Bicarbonate 27 21 - 32 mmol/L    Anion Gap 14 10 - 20 mmol/L    Urea Nitrogen 12 6 - 23 mg/dL    Creatinine 0.93 0.50 - 1.30 mg/dL    eGFR >90 >60 mL/min/1.73m*2    Calcium 8.7 8.6 - 10.3 mg/dL    Albumin 4.0 3.4 - 5.0 g/dL    Alkaline Phosphatase 75 33 - 136 U/L    Total Protein 7.0 6.4 - 8.2 g/dL    AST 24 9 - 39 U/L    Bilirubin, Total 0.7 0.0 - 1.2 mg/dL    ALT 34 10 - 52 U/L   Lactate   Result Value Ref Range    Lactate 0.6 0.4 - 2.0 mmol/L       Imaging  CT soft tissue neck w IV contrast    Bilateral submandibular sialadenitis of undetermined etiology. No  associated sialolith.   Extensive edema within the subcutaneous fat and bilateral submandibular/submental spaces with extension into the mucosa of the oropharynx and supraglottic larynx. Notably the supraglottic mucosal edema results in narrowing of the airway. Findings are likely reactive to bilateral sialoadenitis. Differential also includes angioedema but is thought to be less likely.          Interventions  Medications   dexAMETHasone (Decadron) injection 10 mg ( intravenous Dose Auto Held 3/1/24 1900)   oseltamivir (Tamiflu) capsule 75 mg (75 mg oral Given 2/26/24 1640)              Past Medical History  Past Medical History:   Diagnosis Date    Hypertension        Surgical History  Past Surgical History:   Procedure Laterality Date    OTHER SURGICAL HISTORY  11/02/2020    Back surgery    OTHER SURGICAL HISTORY  11/02/2020    Hip surgery        Social History  He reports that he quit smoking about 10 years ago. His smoking use included cigarettes. He has been exposed to tobacco smoke. He has never used smokeless tobacco. He reports that he does not currently use alcohol. He reports that he does not use drugs.    Family History  No family history on file.     Allergies  Penicillins and Sulfa (sulfonamide antibiotics)    Review of Systems   Negative unless stated above  Physical Exam   Constitutional: Well-developed male in no acute distress on 2l NC  HEENT: Some swelling   Respiratory: CTA bilaterally. No wheezes, rales, or rhonchi. Normal respiratory effort.  Cardiovascular: RRR. No murmurs, gallops, or rubs. No JVD. Radial pulses 2+.  Abdominal: Soft, nondistended, nontender to palpation. Bowel sounds present. No hepatosplenomegaly or masses. No CVA tenderness.  Neuro: CN II-XII intact. UE and LE strength 5/5 bilaterally and sensation intact. Normal FTN testing.  MSK: No LE edema bilaterally.  Skin: Warm, dry. No rashes or wounds.  Psych: Appropriate mood and affect.    Last Recorded Vitals  Blood pressure (!)  128/91, pulse 98, temperature 36.3 °C (97.3 °F), resp. rate 16, SpO2 95 %.    Relevant Results        Active Medications  Scheduled medications  [MAR Hold] dexAMETHasone, 10 mg, intravenous, q8h  dexAMETHasone, 4 mg, intravenous, q8h  famotidine, 20 mg, intravenous, q12h [facility]  [START ON 2/27/2024] oseltamivir, 75 mg, oral, Daily      Continuous medications     PRN medications  PRN medications: oxyCODONE     Recent Labs  Results for orders placed or performed during the hospital encounter of 02/26/24 (from the past 24 hour(s))   Sars-CoV-2 and Influenza A/B PCR   Result Value Ref Range    Flu A Result Detected (A) Not Detected    Flu B Result Not Detected Not Detected    Coronavirus 2019, PCR Not Detected Not Detected   RSV PCR   Result Value Ref Range    RSV PCR Not Detected Not Detected   Group A Streptococcus, PCR    Specimen: Throat/Pharynx; Swab   Result Value Ref Range    Group A Strep PCR Not Detected Not Detected   CBC and Auto Differential   Result Value Ref Range    WBC 6.6 4.4 - 11.3 x10*3/uL    nRBC 0.0 0.0 - 0.0 /100 WBCs    RBC 4.81 4.50 - 5.90 x10*6/uL    Hemoglobin 14.4 13.5 - 17.5 g/dL    Hematocrit 42.0 41.0 - 52.0 %    MCV 87 80 - 100 fL    MCH 29.9 26.0 - 34.0 pg    MCHC 34.3 32.0 - 36.0 g/dL    RDW 12.5 11.5 - 14.5 %    Platelets 196 150 - 450 x10*3/uL    Neutrophils % 73.0 40.0 - 80.0 %    Immature Granulocytes %, Automated 0.5 0.0 - 0.9 %    Lymphocytes % 17.3 13.0 - 44.0 %    Monocytes % 7.3 2.0 - 10.0 %    Eosinophils % 1.4 0.0 - 6.0 %    Basophils % 0.5 0.0 - 2.0 %    Neutrophils Absolute 4.83 1.20 - 7.70 x10*3/uL    Immature Granulocytes Absolute, Automated 0.03 0.00 - 0.70 x10*3/uL    Lymphocytes Absolute 1.14 (L) 1.20 - 4.80 x10*3/uL    Monocytes Absolute 0.48 0.10 - 1.00 x10*3/uL    Eosinophils Absolute 0.09 0.00 - 0.70 x10*3/uL    Basophils Absolute 0.03 0.00 - 0.10 x10*3/uL   Comprehensive Metabolic Panel   Result Value Ref Range    Glucose 104 (H) 74 - 99 mg/dL    Sodium 137 136 - 145  mmol/L    Potassium 4.3 3.5 - 5.3 mmol/L    Chloride 100 98 - 107 mmol/L    Bicarbonate 27 21 - 32 mmol/L    Anion Gap 14 10 - 20 mmol/L    Urea Nitrogen 12 6 - 23 mg/dL    Creatinine 0.93 0.50 - 1.30 mg/dL    eGFR >90 >60 mL/min/1.73m*2    Calcium 8.7 8.6 - 10.3 mg/dL    Albumin 4.0 3.4 - 5.0 g/dL    Alkaline Phosphatase 75 33 - 136 U/L    Total Protein 7.0 6.4 - 8.2 g/dL    AST 24 9 - 39 U/L    Bilirubin, Total 0.7 0.0 - 1.2 mg/dL    ALT 34 10 - 52 U/L   Lactate   Result Value Ref Range    Lactate 0.6 0.4 - 2.0 mmol/L       Imaging  CT soft tissue neck w IV contrast    Result Date: 2/26/2024  Interpreted By:  Yousif Troncoso,  and Vinicio Martínez STUDY: CT SOFT TISSUE NECK W IV CONTRAST;  2/26/2024 9:45 am   INDICATION: Signs/Symptoms:sweling.   COMPARISON: CT neck dated 03/15/2009   ACCESSION NUMBER(S): [id number]   ORDERING CLINICIAN: KRYSTA SANDOVAL   TECHNIQUE: Axial CT images of the neck were obtained.  The patient received 75 mL Omnipaque 350 intravenous contrast agent. The images were reformatted in angled axial, coronal and sagittal planes.   FINDINGS: There is marked swelling and interstitial edematous changes of the bilateral submandibular glands. Additionally there is submucosal edema and thickening throughout the oropharyngeal and supraglottic laryngeal soft tissues with narrowing of the oropharynx and supraglottic larynx. Inflammatory changes extend into the adjacent fat in the submandibular region and subcutaneous fat overlying the platysmas. The bilateral parotid glands are unremarkable   The patient lacks maxillary teeth and mandibular molar/premolars.   The prevertebral and retropharyngeal soft tissues are within normal limits without fluid collection.   No lymphadenopathy in the neck.   The neck vessels are patent and normal in caliber. Atherosclerotic calcifications of the visualized portion of the aortic arch.   The thyroid gland is unremarkable.   Multilevel degenerative changes of the cervical  spine, most pronounced at C5-C6 where posterior disc osteophyte complex moderately narrows the spinal canal.   The visualized upper lungs demonstrate mild centrilobular emphysematous changes.       Bilateral submandibular sialadenitis of undetermined etiology. No associated sialolith.   Extensive edema within the subcutaneous fat and bilateral submandibular/submental spaces with extension into the mucosa of the oropharynx and supraglottic larynx. Notably the supraglottic mucosal edema results in narrowing of the airway. Findings are likely reactive to bilateral sialoadenitis. Differential also includes angioedema but is thought to be less likely.   I personally reviewed the images/study and I agree with the findings as stated by resident physician Dr. Riki Mooney.   MACRO: None   Signed by: Yousif Troncoso 2/26/2024 10:40 AM Dictation workstation:   [id number]            Assessment/Plan   Principal Problem:    Submandibular swelling      Alvaro Christensen is a 68 y.o. male with PMH of HTN, Chronic back pain s/p bilateral SIJ  injections presenting with submandibular area swelling. CT neck showed Extensive edema within the subcutaneous fat and bilateral submandibular/submental spaces with extension into the mucosa of the oropharynx and supraglottic larynx.       NEURO  AOx4    CV  # HTN  - DC lisinopril  - place lisinopril on allergy list  - will chose alternative medication    Pulmonary    # positive for flu  -on tamiflu (2/26-  - CXR pending  - ABG ordered     #angioedema   - ENT consulted: recommended continuing iv steroids  -Received decadron 10mg, pepcid 20mg, bendryl 25mg   -on decadron 4mg q8 for 3 doses,  pepcid 20mg BID, benadryl 25mg TID    # concern for sarita angina  - received cefepime/ Flagyl in ED  -c/w Cefepime/Flagyl pending cultures  - Procal, Bcx x2, CXR    GI  No active issues    Renal  No active issues    ENDO  No active issues    Heme/ONC  No active issues    INFECTIOUS  As above      MSK/DERM  # chronic back pain  :: s/p repeat bilateral SIJ  injections  - dilaudid 0.2 mg Q4h    ICU CHECK LIST:   Antimicrobials: cefepime/ metornidazole  Oxygen: 2 L NC  Feeding: NPO  Fluids: PRN  Analgesia: none   Sedation: none    DVT prophylaxis: lovenox    Lines: PIV    Code Status: full code   NOK: Pavel Christensen   [phone]              Edmundo Sanches MD

## 2024-02-26 NOTE — CONSULTS
ENT DEPARTMENT CONSULTATION NOTE  Name: Alvaro Christensen  MRN: [id number]  : 1955  Consulted for: evaluation of airway    History of Present Illness  The patient is a 68 y.o. male with past medical history significant for but not limited to hypertension who presented to [facility] on 2024 via thelma flight from [facility] due to concern for airway compromise. Of note, patient history is limited as he was given sedative medication prior to transfer from OSH and remains mildly sleepy. Patient presented to [facility] this morning; per outside ER notes, patient presented with throat pain and throat swelling that started last night as well as with a 5 day history of cough. He denies injury or trauma. At [facility], labs notable for +influenza A. WBC 6.6. CT soft tissue neck performed suggesting bilateral submandibular sialadenitis of undetermined etiology and extensive subcutaneous fat and bilateral submandibular/submental space edema with extension into the mucosa of the oropharynx and supraglottic larynx with narrowing of the airway. At outside hospital, patient was given IV antibiotics and steroids prior to transfer to [facility]. Denies increased work of breathing, SOB, choking. Denies prior episodes similar to this.     Upon presentation to [facility], vitals 131/86, , SpO2 92% on nasal cannula, temp 97.3 F, RR 16. Consulted by ER upon arrival for airway evaluation.     Review of Systems  Unable to obtain full ROS as patient is mildly sedated/sleepy    Past Medical History  Past Medical History:   Diagnosis Date    Hypertension        Past Surgical History  Past Surgical History:   Procedure Laterality Date    OTHER SURGICAL HISTORY  2020    Back surgery    OTHER SURGICAL HISTORY  2020    Hip surgery       Allergies  Allergies   Allergen Reactions    Penicillins Anaphylaxis and Other    Sulfa (Sulfonamide Antibiotics) Anaphylaxis       Medications    Current Facility-Administered Medications:      dexAMETHasone (Decadron) injection 10 mg, 10 mg, intravenous, q8h, Alejandro Munoz, DO    Current Outpatient Medications:     aspirin 81 mg EC tablet, Take 1 tablet (81 mg) by mouth once daily., Disp: , Rfl:     diclofenac sodium (Voltaren) 1 % gel, Apply 2.25 inches (2 g) topically 4 times a day., Disp: , Rfl:     lansoprazole (Prevacid) 30 mg DR capsule, Take 1 capsule (30 mg) by mouth once daily in the morning. Take before meals., Disp: , Rfl:     lisinopriL-hydrochlorothiazide 10-12.5 mg tablet, Take 1 tablet by mouth once daily., Disp: , Rfl:     meloxicam (Mobic) 15 mg tablet, Take 1 tablet (15 mg) by mouth once daily., Disp: , Rfl:     oxyCODONE (Roxicodone) 10 mg immediate release tablet, Take 1 tablet (10 mg) by mouth every 8 hours if needed for severe pain (7 - 10) or moderate pain (4 - 6)., Disp: , Rfl:     Family History  No family history on file.    Social History  Social History     Socioeconomic History    Marital status:      Spouse name: Not on file    Number of children: Not on file    Years of education: Not on file    Highest education level: Not on file   Occupational History    Not on file   Tobacco Use    Smoking status: Former     Types: Cigarettes     Quit date: 2/2/2014     Years since quitting: 10.0     Passive exposure: Past    Smokeless tobacco: Never   Vaping Use    Vaping Use: Never used   Substance and Sexual Activity    Alcohol use: Not Currently    Drug use: Never    Sexual activity: Not on file   Other Topics Concern    Not on file   Social History Narrative    Not on file     Social Determinants of Health     Financial Resource Strain: Not on file   Food Insecurity: Not on file   Transportation Needs: Not on file   Physical Activity: Not on file   Stress: Not on file   Social Connections: Not on file   Intimate Partner Violence: Not on file   Housing Stability: Not on file       Vital Signs  Vitals:    02/26/24 1445   BP: 131/86   Pulse: (!) 102   Resp: 16   Temp: 36.3 °C  (97.3 °F)   SpO2: (!) 92%         Physical Exam:  CONSTITUTIONAL:  No acute distress, obese  VOICE:  normal voice  RESPIRATION:  No increased work of breathing, no stridor  CV:  No clubbing/cyanosis/edema in hands  EYES:  EOM intact  NEURO:  mildly sedated/sleepy   HEAD AND FACE:  Generalized facial, submandibular and neck edema. No tenderness to palpation. No erythema or increased warmth.   EARS:  Normal external ears  NOSE:  External nose midline  ORAL CAVITY/OROPHARYNX/LIPS: Edematous oral cavity, edematous tongue, soft FOM, nonfirm, no evidence of sarita's angina; able to visualize posterior pharyngeal wall on visual examination    NECK/LYMPH: no palpable lymphadenopathy    SKIN:  Edematous neck skin but soft, appropriate facial skin coloration, no increased warmth     Procedure Note: Flexible Nasolaryngoscopy  Verbal informed consent was obtained from the patient/patient's guardian. 4% lidocaine mixed with phenylephrine was prepared and dripped into the nose. It was placed in the left naris. Following an appropriate amount of time to allow for adequate anesthesia, a flexible fiberoptic nasolaryngoscope was placed into the patient's left naris. The nasal cavity, nasopharynx, oropharynx, hypopharynx, and all endolaryngeal structures were visualized and were normal except as listed below. Significant findings included:  -diffuse supraglottic watery edema  -concentric collapse of supraglottics/oropharynx  -able to visualize vocal cords which are symmetrically mobile  -no gross upper airway obstruction  -minimal secretion burden  -no tumors or masses     Radiology reviewed:  I personally reviewed the CT Neck from 2/26/2024  and this is my impression:  extensive subcutaneous fat and bilateral submandibular/submental space edema that extends to the oropharyngeal and supraglottic larynx with narrowing of the airway     Other Studies/Information:  I personally reviewed the consultant notes or primary team notes as well  as the following other studies:     Results for orders placed or performed during the hospital encounter of 02/26/24 (from the past 24 hour(s))   Sars-CoV-2 and Influenza A/B PCR   Result Value Ref Range    Flu A Result Detected (A) Not Detected    Flu B Result Not Detected Not Detected    Coronavirus 2019, PCR Not Detected Not Detected   RSV PCR   Result Value Ref Range    RSV PCR Not Detected Not Detected   Group A Streptococcus, PCR    Specimen: Throat/Pharynx; Swab   Result Value Ref Range    Group A Strep PCR Not Detected Not Detected   CBC and Auto Differential   Result Value Ref Range    WBC 6.6 4.4 - 11.3 x10*3/uL    nRBC 0.0 0.0 - 0.0 /100 WBCs    RBC 4.81 4.50 - 5.90 x10*6/uL    Hemoglobin 14.4 13.5 - 17.5 g/dL    Hematocrit 42.0 41.0 - 52.0 %    MCV 87 80 - 100 fL    MCH 29.9 26.0 - 34.0 pg    MCHC 34.3 32.0 - 36.0 g/dL    RDW 12.5 11.5 - 14.5 %    Platelets 196 150 - 450 x10*3/uL    Neutrophils % 73.0 40.0 - 80.0 %    Immature Granulocytes %, Automated 0.5 0.0 - 0.9 %    Lymphocytes % 17.3 13.0 - 44.0 %    Monocytes % 7.3 2.0 - 10.0 %    Eosinophils % 1.4 0.0 - 6.0 %    Basophils % 0.5 0.0 - 2.0 %    Neutrophils Absolute 4.83 1.20 - 7.70 x10*3/uL    Immature Granulocytes Absolute, Automated 0.03 0.00 - 0.70 x10*3/uL    Lymphocytes Absolute 1.14 (L) 1.20 - 4.80 x10*3/uL    Monocytes Absolute 0.48 0.10 - 1.00 x10*3/uL    Eosinophils Absolute 0.09 0.00 - 0.70 x10*3/uL    Basophils Absolute 0.03 0.00 - 0.10 x10*3/uL   Comprehensive Metabolic Panel   Result Value Ref Range    Glucose 104 (H) 74 - 99 mg/dL    Sodium 137 136 - 145 mmol/L    Potassium 4.3 3.5 - 5.3 mmol/L    Chloride 100 98 - 107 mmol/L    Bicarbonate 27 21 - 32 mmol/L    Anion Gap 14 10 - 20 mmol/L    Urea Nitrogen 12 6 - 23 mg/dL    Creatinine 0.93 0.50 - 1.30 mg/dL    eGFR >90 >60 mL/min/1.73m*2    Calcium 8.7 8.6 - 10.3 mg/dL    Albumin 4.0 3.4 - 5.0 g/dL    Alkaline Phosphatase 75 33 - 136 U/L    Total Protein 7.0 6.4 - 8.2 g/dL    AST 24 9  - 39 U/L    Bilirubin, Total 0.7 0.0 - 1.2 mg/dL    ALT 34 10 - 52 U/L   Lactate   Result Value Ref Range    Lactate 0.6 0.4 - 2.0 mmol/L       Assessment and Plan:  The patient is a 68 y.o. male with past medical history significant for but not limited to hypertension who presented to [facility] on 2/26/2024 via thelma flight from [facility] due to concern for airway compromise. CT at OSH suggests: bilateral submandibular sialadenitis of undetermined etiology and extensive subcutaneous fat and bilateral submandibular/submental space edema with extension into the mucosa of the oropharynx and supraglottic larynx with narrowing of the airway. WBC 6.6. Neck and submandibular soft tissue soft and edematous. Bedside nasolaryngscope performed with evidence of diffuse supraglottic watery edema.     Recommendations:  -patient seen and examined with Dr. Jain  -admit to MICU for airway monitoring  -continue IV steroids  -no indication for IV antibiotics from ENT standpoint  -notify ENT if acute change in respiratory status - patient should be intubatable safely via glidescope  -ENT will continue to follow    Johnie Baron MD  Dept. of Otolaryngology - Head and Neck Surgery, PGY-2  ENT Adult: [id number]  ENT Peds: [id number]  ENT Outpatient scheduling number: [phone]

## 2024-02-26 NOTE — ED TRIAGE NOTES
Patient arrived via flight for eval of possible angioedema. Pt has had 1 week of chronic cough (flu A in [place]).  Pt presented to theirED for eval today after waking up and feeling like his throat was swelling and getting worse.  Ct in [place] showed significant soft tissue swelling in the mandibular region.  Sent for eval for threatened airway.

## 2024-02-27 LAB
ALBUMIN SERPL BCP-MCNC: 3.1 G/DL (ref 3.4–5)
ALBUMIN SERPL BCP-MCNC: 3.4 G/DL (ref 3.4–5)
ALP SERPL-CCNC: 52 U/L (ref 33–136)
ALT SERPL W P-5'-P-CCNC: 22 U/L (ref 10–52)
ANION GAP SERPL CALC-SCNC: 12 MMOL/L (ref 10–20)
ANION GAP SERPL CALC-SCNC: <7 MMOL/L (ref 10–20)
AST SERPL W P-5'-P-CCNC: 29 U/L (ref 9–39)
BASOPHILS # BLD AUTO: 0.01 X10*3/UL (ref 0–0.1)
BASOPHILS # BLD AUTO: 0.01 X10*3/UL (ref 0–0.1)
BASOPHILS NFR BLD AUTO: 0.1 %
BASOPHILS NFR BLD AUTO: 0.1 %
BILIRUB SERPL-MCNC: 0.8 MG/DL (ref 0–1.2)
BUN SERPL-MCNC: 19 MG/DL (ref 6–23)
BUN SERPL-MCNC: 22 MG/DL (ref 6–23)
CALCIUM SERPL-MCNC: 7 MG/DL (ref 8.6–10.6)
CALCIUM SERPL-MCNC: 8.3 MG/DL (ref 8.6–10.6)
CHLORIDE SERPL-SCNC: 102 MMOL/L (ref 98–107)
CHLORIDE SERPL-SCNC: 104 MMOL/L (ref 98–107)
CO2 SERPL-SCNC: 20 MMOL/L (ref 21–32)
CO2 SERPL-SCNC: 25 MMOL/L (ref 21–32)
CREAT SERPL-MCNC: 0.88 MG/DL (ref 0.5–1.3)
CREAT SERPL-MCNC: 1.08 MG/DL (ref 0.5–1.3)
EGFRCR SERPLBLD CKD-EPI 2021: 76 ML/MIN/1.73M*2
EGFRCR SERPLBLD CKD-EPI 2021: >90 ML/MIN/1.73M*2
EOSINOPHIL # BLD AUTO: 0 X10*3/UL (ref 0–0.7)
EOSINOPHIL # BLD AUTO: 0 X10*3/UL (ref 0–0.7)
EOSINOPHIL NFR BLD AUTO: 0 %
EOSINOPHIL NFR BLD AUTO: 0 %
ERYTHROCYTE [DISTWIDTH] IN BLOOD BY AUTOMATED COUNT: 12.1 % (ref 11.5–14.5)
ERYTHROCYTE [DISTWIDTH] IN BLOOD BY AUTOMATED COUNT: 12.4 % (ref 11.5–14.5)
GLUCOSE BLD MANUAL STRIP-MCNC: 137 MG/DL (ref 74–99)
GLUCOSE BLD MANUAL STRIP-MCNC: 148 MG/DL (ref 74–99)
GLUCOSE BLD MANUAL STRIP-MCNC: 158 MG/DL (ref 74–99)
GLUCOSE SERPL-MCNC: 123 MG/DL (ref 74–99)
GLUCOSE SERPL-MCNC: 286 MG/DL (ref 74–99)
HCT VFR BLD AUTO: 35.1 % (ref 41–52)
HCT VFR BLD AUTO: 39.1 % (ref 41–52)
HGB BLD-MCNC: 12.7 G/DL (ref 13.5–17.5)
HGB BLD-MCNC: 13.3 G/DL (ref 13.5–17.5)
IMM GRANULOCYTES # BLD AUTO: 0.03 X10*3/UL (ref 0–0.7)
IMM GRANULOCYTES # BLD AUTO: 0.04 X10*3/UL (ref 0–0.7)
IMM GRANULOCYTES NFR BLD AUTO: 0.4 % (ref 0–0.9)
IMM GRANULOCYTES NFR BLD AUTO: 0.5 % (ref 0–0.9)
LYMPHOCYTES # BLD AUTO: 0.38 X10*3/UL (ref 1.2–4.8)
LYMPHOCYTES # BLD AUTO: 0.52 X10*3/UL (ref 1.2–4.8)
LYMPHOCYTES NFR BLD AUTO: 4.4 %
LYMPHOCYTES NFR BLD AUTO: 5.9 %
MAGNESIUM SERPL-MCNC: 1.71 MG/DL (ref 1.6–2.4)
MAGNESIUM SERPL-MCNC: 1.91 MG/DL (ref 1.6–2.4)
MCH RBC QN AUTO: 29.8 PG (ref 26–34)
MCH RBC QN AUTO: 29.8 PG (ref 26–34)
MCHC RBC AUTO-ENTMCNC: 34 G/DL (ref 32–36)
MCHC RBC AUTO-ENTMCNC: 36.2 G/DL (ref 32–36)
MCV RBC AUTO: 82 FL (ref 80–100)
MCV RBC AUTO: 88 FL (ref 80–100)
MONOCYTES # BLD AUTO: 0.17 X10*3/UL (ref 0.1–1)
MONOCYTES # BLD AUTO: 0.35 X10*3/UL (ref 0.1–1)
MONOCYTES NFR BLD AUTO: 2 %
MONOCYTES NFR BLD AUTO: 4 %
NEUTROPHILS # BLD AUTO: 7.83 X10*3/UL (ref 1.2–7.7)
NEUTROPHILS # BLD AUTO: 7.97 X10*3/UL (ref 1.2–7.7)
NEUTROPHILS NFR BLD AUTO: 89.5 %
NEUTROPHILS NFR BLD AUTO: 93.1 %
NRBC BLD-RTO: 0 /100 WBCS (ref 0–0)
NRBC BLD-RTO: 0 /100 WBCS (ref 0–0)
PHOSPHATE SERPL-MCNC: 3.4 MG/DL (ref 2.5–4.9)
PHOSPHATE SERPL-MCNC: 3.4 MG/DL (ref 2.5–4.9)
PLATELET # BLD AUTO: 108 X10*3/UL (ref 150–450)
PLATELET # BLD AUTO: 200 X10*3/UL (ref 150–450)
POTASSIUM SERPL-SCNC: 4.2 MMOL/L (ref 3.5–5.3)
POTASSIUM SERPL-SCNC: 5 MMOL/L (ref 3.5–5.3)
PROT SERPL-MCNC: 6.6 G/DL (ref 6.4–8.2)
RBC # BLD AUTO: 4.26 X10*6/UL (ref 4.5–5.9)
RBC # BLD AUTO: 4.47 X10*6/UL (ref 4.5–5.9)
SODIUM SERPL-SCNC: 122 MMOL/L (ref 136–145)
SODIUM SERPL-SCNC: 137 MMOL/L (ref 136–145)
WBC # BLD AUTO: 8.6 X10*3/UL (ref 4.4–11.3)
WBC # BLD AUTO: 8.8 X10*3/UL (ref 4.4–11.3)

## 2024-02-27 PROCEDURE — 2500000002 HC RX 250 W HCPCS SELF ADMINISTERED DRUGS (ALT 637 FOR MEDICARE OP, ALT 636 FOR OP/ED): Performed by: STUDENT IN AN ORGANIZED HEALTH CARE EDUCATION/TRAINING PROGRAM

## 2024-02-27 PROCEDURE — 2500000004 HC RX 250 GENERAL PHARMACY W/ HCPCS (ALT 636 FOR OP/ED): Performed by: STUDENT IN AN ORGANIZED HEALTH CARE EDUCATION/TRAINING PROGRAM

## 2024-02-27 PROCEDURE — 36415 COLL VENOUS BLD VENIPUNCTURE: CPT

## 2024-02-27 PROCEDURE — 84075 ASSAY ALKALINE PHOSPHATASE: CPT

## 2024-02-27 PROCEDURE — 82947 ASSAY GLUCOSE BLOOD QUANT: CPT

## 2024-02-27 PROCEDURE — 99291 CRITICAL CARE FIRST HOUR: CPT | Performed by: INTERNAL MEDICINE

## 2024-02-27 PROCEDURE — 96366 THER/PROPH/DIAG IV INF ADDON: CPT

## 2024-02-27 PROCEDURE — 2500000001 HC RX 250 WO HCPCS SELF ADMINISTERED DRUGS (ALT 637 FOR MEDICARE OP): Performed by: STUDENT IN AN ORGANIZED HEALTH CARE EDUCATION/TRAINING PROGRAM

## 2024-02-27 PROCEDURE — C9113 INJ PANTOPRAZOLE SODIUM, VIA: HCPCS | Performed by: STUDENT IN AN ORGANIZED HEALTH CARE EDUCATION/TRAINING PROGRAM

## 2024-02-27 PROCEDURE — 96376 TX/PRO/DX INJ SAME DRUG ADON: CPT

## 2024-02-27 PROCEDURE — 36415 COLL VENOUS BLD VENIPUNCTURE: CPT | Performed by: STUDENT IN AN ORGANIZED HEALTH CARE EDUCATION/TRAINING PROGRAM

## 2024-02-27 PROCEDURE — 2500000004 HC RX 250 GENERAL PHARMACY W/ HCPCS (ALT 636 FOR OP/ED)

## 2024-02-27 PROCEDURE — 80069 RENAL FUNCTION PANEL: CPT | Mod: CCI | Performed by: STUDENT IN AN ORGANIZED HEALTH CARE EDUCATION/TRAINING PROGRAM

## 2024-02-27 PROCEDURE — 84100 ASSAY OF PHOSPHORUS: CPT

## 2024-02-27 PROCEDURE — 1100000001 HC PRIVATE ROOM DAILY

## 2024-02-27 PROCEDURE — 85025 COMPLETE CBC W/AUTO DIFF WBC: CPT | Performed by: STUDENT IN AN ORGANIZED HEALTH CARE EDUCATION/TRAINING PROGRAM

## 2024-02-27 PROCEDURE — 83735 ASSAY OF MAGNESIUM: CPT | Performed by: STUDENT IN AN ORGANIZED HEALTH CARE EDUCATION/TRAINING PROGRAM

## 2024-02-27 PROCEDURE — 83735 ASSAY OF MAGNESIUM: CPT

## 2024-02-27 PROCEDURE — 85025 COMPLETE CBC W/AUTO DIFF WBC: CPT

## 2024-02-27 RX ORDER — OSELTAMIVIR PHOSPHATE 75 MG/1
75 CAPSULE ORAL 2 TIMES DAILY
Status: DISCONTINUED | OUTPATIENT
Start: 2024-02-27 | End: 2024-02-29 | Stop reason: HOSPADM

## 2024-02-27 RX ORDER — BENZONATATE 100 MG/1
100 CAPSULE ORAL 3 TIMES DAILY PRN
Status: DISCONTINUED | OUTPATIENT
Start: 2024-02-27 | End: 2024-02-29 | Stop reason: HOSPADM

## 2024-02-27 RX ORDER — PANTOPRAZOLE SODIUM 40 MG/1
40 TABLET, DELAYED RELEASE ORAL
Status: DISCONTINUED | OUTPATIENT
Start: 2024-02-28 | End: 2024-02-29 | Stop reason: HOSPADM

## 2024-02-27 RX ORDER — ACETAMINOPHEN 160 MG/5ML
650 SOLUTION ORAL EVERY 4 HOURS PRN
Status: DISCONTINUED | OUTPATIENT
Start: 2024-02-27 | End: 2024-02-29 | Stop reason: HOSPADM

## 2024-02-27 RX ORDER — DEXTROSE 50 % IN WATER (D50W) INTRAVENOUS SYRINGE
25
Status: DISCONTINUED | OUTPATIENT
Start: 2024-02-27 | End: 2024-02-29 | Stop reason: HOSPADM

## 2024-02-27 RX ORDER — ACETAMINOPHEN 325 MG/1
650 TABLET ORAL EVERY 4 HOURS PRN
Status: DISCONTINUED | OUTPATIENT
Start: 2024-02-27 | End: 2024-02-29 | Stop reason: HOSPADM

## 2024-02-27 RX ORDER — MELOXICAM 15 MG/1
15 TABLET ORAL
Status: DISCONTINUED | OUTPATIENT
Start: 2024-02-28 | End: 2024-02-29 | Stop reason: HOSPADM

## 2024-02-27 RX ORDER — OXYCODONE HYDROCHLORIDE 5 MG/1
10 TABLET ORAL EVERY 8 HOURS PRN
Status: DISCONTINUED | OUTPATIENT
Start: 2024-02-27 | End: 2024-02-29 | Stop reason: HOSPADM

## 2024-02-27 RX ORDER — INSULIN LISPRO 100 [IU]/ML
0-10 INJECTION, SOLUTION INTRAVENOUS; SUBCUTANEOUS
Status: DISCONTINUED | OUTPATIENT
Start: 2024-02-27 | End: 2024-02-29 | Stop reason: HOSPADM

## 2024-02-27 RX ORDER — DEXTROSE MONOHYDRATE 100 MG/ML
0.3 INJECTION, SOLUTION INTRAVENOUS ONCE AS NEEDED
Status: DISCONTINUED | OUTPATIENT
Start: 2024-02-27 | End: 2024-02-29 | Stop reason: HOSPADM

## 2024-02-27 RX ORDER — ACETAMINOPHEN 650 MG/1
650 SUPPOSITORY RECTAL EVERY 4 HOURS PRN
Status: DISCONTINUED | OUTPATIENT
Start: 2024-02-27 | End: 2024-02-29 | Stop reason: HOSPADM

## 2024-02-27 RX ADMIN — OXYCODONE HYDROCHLORIDE 10 MG: 5 TABLET ORAL at 21:56

## 2024-02-27 RX ADMIN — FAMOTIDINE 20 MG: 10 INJECTION INTRAVENOUS at 09:00

## 2024-02-27 RX ADMIN — ASPIRIN 81 MG: 81 TABLET, COATED ORAL at 06:14

## 2024-02-27 RX ADMIN — CEFEPIME 2 G: 1 INJECTION, SOLUTION INTRAVENOUS at 02:00

## 2024-02-27 RX ADMIN — METRONIDAZOLE 500 MG: 500 INJECTION, SOLUTION INTRAVENOUS at 02:00

## 2024-02-27 RX ADMIN — DIPHENHYDRAMINE HYDROCHLORIDE 25 MG: 50 INJECTION INTRAMUSCULAR; INTRAVENOUS at 05:01

## 2024-02-27 RX ADMIN — OSELTAMIVIR PHOSPHATE 75 MG: 75 CAPSULE ORAL at 21:47

## 2024-02-27 RX ADMIN — DEXAMETHASONE SODIUM PHOSPHATE 4 MG: 4 INJECTION, SOLUTION INTRAMUSCULAR; INTRAVENOUS at 08:58

## 2024-02-27 RX ADMIN — HYDROMORPHONE HYDROCHLORIDE 0.2 MG: 1 INJECTION, SOLUTION INTRAMUSCULAR; INTRAVENOUS; SUBCUTANEOUS at 00:20

## 2024-02-27 RX ADMIN — DEXAMETHASONE SODIUM PHOSPHATE 4 MG: 4 INJECTION, SOLUTION INTRAMUSCULAR; INTRAVENOUS at 01:25

## 2024-02-27 RX ADMIN — ENOXAPARIN SODIUM 40 MG: 100 INJECTION SUBCUTANEOUS at 21:47

## 2024-02-27 RX ADMIN — BENZONATATE 100 MG: 100 CAPSULE ORAL at 05:00

## 2024-02-27 RX ADMIN — PANTOPRAZOLE SODIUM 40 MG: 40 INJECTION, POWDER, FOR SOLUTION INTRAVENOUS at 08:58

## 2024-02-27 SDOH — SOCIAL STABILITY: SOCIAL INSECURITY
WITHIN THE LAST YEAR, HAVE YOU BEEN KICKED, HIT, SLAPPED, OR OTHERWISE PHYSICALLY HURT BY YOUR PARTNER OR EX-PARTNER?: NO

## 2024-02-27 SDOH — SOCIAL STABILITY: SOCIAL INSECURITY: WITHIN THE LAST YEAR, HAVE YOU BEEN AFRAID OF YOUR PARTNER OR EX-PARTNER?: NO

## 2024-02-27 SDOH — ECONOMIC STABILITY: FOOD INSECURITY: WITHIN THE PAST 12 MONTHS, YOU WORRIED THAT YOUR FOOD WOULD RUN OUT BEFORE YOU GOT MONEY TO BUY MORE.: NEVER TRUE

## 2024-02-27 SDOH — SOCIAL STABILITY: SOCIAL INSECURITY
WITHIN THE LAST YEAR, HAVE TO BEEN RAPED OR FORCED TO HAVE ANY KIND OF SEXUAL ACTIVITY BY YOUR PARTNER OR EX-PARTNER?: NO

## 2024-02-27 SDOH — ECONOMIC STABILITY: FOOD INSECURITY: WITHIN THE PAST 12 MONTHS, THE FOOD YOU BOUGHT JUST DIDN'T LAST AND YOU DIDN'T HAVE MONEY TO GET MORE.: NEVER TRUE

## 2024-02-27 SDOH — ECONOMIC STABILITY: INCOME INSECURITY: IN THE PAST 12 MONTHS, HAS THE ELECTRIC, GAS, OIL, OR WATER COMPANY THREATENED TO SHUT OFF SERVICE IN YOUR HOME?: NO

## 2024-02-27 SDOH — SOCIAL STABILITY: SOCIAL INSECURITY: WITHIN THE LAST YEAR, HAVE YOU BEEN HUMILIATED OR EMOTIONALLY ABUSED IN OTHER WAYS BY YOUR PARTNER OR EX-PARTNER?: NO

## 2024-02-27 ASSESSMENT — COGNITIVE AND FUNCTIONAL STATUS - GENERAL
MOBILITY SCORE: 24
MOBILITY SCORE: 24
DAILY ACTIVITIY SCORE: 24
DAILY ACTIVITIY SCORE: 24

## 2024-02-27 ASSESSMENT — PAIN SCALES - GENERAL
PAINLEVEL_OUTOF10: 0 - NO PAIN
PAINLEVEL_OUTOF10: 0 - NO PAIN
PAINLEVEL_OUTOF10: 8
PAINLEVEL_OUTOF10: 9
PAINLEVEL_OUTOF10: 0 - NO PAIN
PAINLEVEL_OUTOF10: 0 - NO PAIN

## 2024-02-27 ASSESSMENT — PAIN - FUNCTIONAL ASSESSMENT
PAIN_FUNCTIONAL_ASSESSMENT: 0-10

## 2024-02-27 ASSESSMENT — ACTIVITIES OF DAILY LIVING (ADL): LACK_OF_TRANSPORTATION: NO

## 2024-02-27 ASSESSMENT — PAIN DESCRIPTION - LOCATION: LOCATION: BACK

## 2024-02-27 NOTE — PROGRESS NOTES
Pharmacy Medication History Review    Alvaro Christensen is a 68 y.o. male admitted for Submandibular swelling. Pharmacy reviewed the patient's [id number] medications and allergies for accuracy.    The list below reflects the updated PTA list. Comments regarding how patient may be taking medications differently can be found in the Admit Orders Activity  Prior to Admission Medications   Prescriptions Last Dose Informant Patient Reported?   aspirin 81 mg EC tablet  Self Yes   Sig: Take 1 tablet (81 mg) by mouth once daily.   diclofenac sodium (Voltaren) 1 % gel  Self Yes   Sig: Apply 2.25 inches (2 g) topically 4 times a day.   lansoprazole (Prevacid) 15 mg DR capsule  Self Yes   Sig: Take 1 capsule (15 mg) by mouth once daily in the morning. Take before meals.   lisinopriL-hydrochlorothiazide 10-12.5 mg tablet  Self Yes   Sig: Take 1 tablet by mouth once daily.   meloxicam (Mobic) 15 mg tablet  Self Yes   Sig: Take 1 tablet (15 mg) by mouth once daily.   oxyCODONE (Roxicodone) 10 mg immediate release tablet  Self Yes   Sig: Take 1 tablet (10 mg) by mouth every 8 hours if needed for severe pain (7 - 10) or moderate pain (4 - 6).      Facility-Administered Medications: None        The list below reflects the updated allergy list. Please review each documented allergy for additional clarification and justification.  Allergies  Reviewed by Bety Mcgraw RN on 2/26/2024        Severity Reactions Comments    Ace Inhibitors High Angioedema     Penicillins High Anaphylaxis, Other     Sulfa (sulfonamide Antibiotics) High Anaphylaxis             Patient accepts M2B at discharge. Pharmacy has been updated to [facility].    Sources used to complete the med history include outpatient fill history, OARRs reviewed and patient interview. Patient was a good historian.     Below are additional concerns with the patient's PTA list.  - None    Cristina Carroll, Elvin  Transitions of Care Pharmacist  [facility]  Ambulatory and Retail Services  Please reach out via Secure Chat for questions, or if no response call [facility] or [facility]

## 2024-02-27 NOTE — SIGNIFICANT EVENT
"ICU to [facility] Transfer Summary     I:  ICU Admission Reason & Brief ICU Course:    Alvaro Christensen is a 68 y.o. male with PMH of HTN, Chronic back pain s/p bilateral SIJ  injections presenting with submandibular area swelling. CT neck showed Extensive edema within the subcutaneous fat and bilateral submandibular/submental spaces with extension into the mucosa of the oropharynx and supraglottic larynx. Patient was also found to be flu positive and was started on tamiflu.  Was seen by ENT in the ED and they are described as \"watery edema\" .  Patient was transferred to the MICU for close airway monitoring.  Patient received steroids, antihistamines, and H2 blockers for suspected angioedema and ACE inhibitor's were placed on his allergy list.   Patient received on cefepime and metronidazole for suspected sarita angina however now discontinued in light of clinical improvement , and absence of signs and symptoms of infectious cause. On exam this morning patient is satting well on RA, with neck swelling minimal and able to converse clearly.  Steroids, benadryl, and Pepcid discontinued iso improved clinical exam. ENT agreed that patient is stable for transfer to floor.    Things to follow up  [ ] start suitable alternative bp medication     [ ] Consider alternative pain medications as patient on chronic oxycodone for pain      C: Code Status/DPOA Info/Goals of Care/ACP Note    Code Status: full code   NOK: Pavel Christensen   [phone]     U: Unprescribing & Pertinent High-Risk Medications    Changes to home meds: discontinue  Lisonpril     Anticoagulation: Lovenox for DVT prevention    Antibiotics:   [] N/A - no current planned antimicrobioals  [x] Tamiflu for total of 5 days    P: Pending Tests at the Time of Transfer   none      A: Active consultants, including Rehab:   [x]  Subspecialty Consultants:  ENT  []  PT  []  OT  []  SLP  []  Wound Care    U: Uncertainty Measure/Diagnostic Pause:    Working diagnosis " at the time of transfer angioedema vs sialadenitis     Diagnosis Degree of Certainty: 2. Some uncertainty about the clinical diagnosis.     S: Summary of Major Problems and To-Dos:   # HTN  - DC lisinopril  - place lisinopril on allergy list  [ ] will chose alternative medication ( can consider amlodipine)    # positive for flu  -on tamiflu (2/26-    #angioedema   #sialadenitis   - ENT consulted: recommended continuing dc steroids  -Received decadron 10mg, pepcid 20mg, bendryl 25mg in ED  -recieved decadron 4mg q8 for 3 doses,  pepcid 20mg x2, benadryl 25mg x3 in ICU  - warm compress  -ensure adequate hydration  - sialagogues   - OK for diet  [ ] Will monitor for 24 h more then dc      # concern for sarita angina  - received cefepime/ Flagyl in ED  -dc Cefepime/Flagyl   - Procal, CXR Negative      # chronic back pain  :: s/p repeat bilateral SIJ  injections  - tylenol Prn    #steroid induced hyperglycemia  #Pre-daibetes  -HBa1C 6.1: consider metformin on dc  -SSI            E: Exam, including Lines/Drains/Airways & Data Review:    Constitutional: Well-developed male in no acute distress on 2l NC  HEENT: minimal swelling   Respiratory: CTA bilaterally. No wheezes, rales, or rhonchi. Normal respiratory effort.  Cardiovascular: RRR. No murmurs, gallops, or rubs. No JVD. Radial pulses 2+.  Abdominal: Soft, nondistended, nontender to palpation. Bowel sounds present. No hepatosplenomegaly or masses. No CVA tenderness.  Neuro: CN II-XII intact. UE and LE strength 5/5 bilaterally and sensation intact. Normal FTN testing.  MSK: No LE edema bilaterally.  Skin: Warm, dry. No rashes or wounds.  Psych: Appropriate mood and affect.  Difficult airway? No  Lines/drains assessed for removal? No    Within 30 minutes of the patient physically leaving the floor, a Floor Readiness Note needs to be placed with updated vitals.

## 2024-02-27 NOTE — SIGNIFICANT EVENT
Patient seen this morning  Continues to demonstrate clinical improvement  No fevers/sweats/chills    On 1L NC, satting 94%  No audible stridor  Submental and submandibular soft tissue swelling improving; tongue edema resolved  Soft FOM, no intraoral lesions/poor dentition  Able to visualize posterior pharyngeal wall    Assessment/Plan:  Ok for transfer to medicine floor from ENT perspective  Clinical picture likely consistent with sialadenitis in setting of recent URI/flu  Aggressive oral and IV rehydration  Warm compresses to submandibular region  Defer abx to primary team - given lack of WBC, may not be indicated  Sialogogues  Salivary gland massage    ENT will continue to peripherally follow    Johnie Baron MD  Dept. of Otolaryngology - Head and Neck Surgery, PGY-2  ENT Adult: [id number]  ENT Peds: [id number]  ENT Outpatient scheduling number: [phone]

## 2024-02-27 NOTE — SIGNIFICANT EVENT
"Floor Readiness Note       I, personally, evaluated Alvaro Christensen prior to transfer to the floor, including reviewing all current laboratory and imaging studies. The patient remains appropriate for transfer to the floor. Bedside nurse and respiratory therapy are also in agreement of patient's readiness for the floor.     Brief summary:  Alvaro Christensen is a 68 y.o. male with PMH of HTN, Chronic back pain s/p bilateral SIJ  injections presenting with submandibular area swelling. CT neck showed Extensive edema within the subcutaneous fat and bilateral submandibular/submental spaces with extension into the mucosa of the oropharynx and supraglottic larynx. Patient was also found to be flu positive and was started on tamiflu.  Was seen by ENT in the ED and they are described as \"watery edema\" .  Patient was transferred to the MICU for close airway monitoring.  Patient received steroids, antihistamines, and H2 blockers for suspected angioedema and ACE inhibitor's were placed on his allergy list.   Patient received on cefepime and metronidazole for suspected sarita angina however now discontinued in light of clinical improvement , and absence of signs and symptoms of infectious cause. On exam this morning patient is satting well on RA, with neck swelling minimal and able to converse clearly.  Steroids, benadryl, and Pepcid discontinued iso improved clinical exam. ENT agreed that patient is stable for transfer to floor.    Updated focused Physical Exam:  Swelling imroved    Current Vital Signs:  Heart Rate: 72 (02/27/24 1300 : Yoselyn Child RN)  BP: (!) 134/116 (02/27/24 1300 : Yoselyn Child RN)  Temp: 36.3 °C (97.3 °F) (02/27/24 1100 : Williams Lemon)  Resp: 13 (02/27/24 1300 : Yoselyn Child, RN)  SpO2: 98 % (02/27/24 1300 : Yoselyn Child, RN)    Relevant updates since rounds:  none    Accepting team, Hospitalist HAYLEY, received verbal sign out and the Provider Care team/Attending has been " updated. Bedside nurse will now call accepting nurse for report and patient will be transferred to [place] [id number].    Edmundo Sanches MD

## 2024-02-27 NOTE — CARE PLAN
The clinical goals for the shift include Pt will not have s/sx of SOB    Over the shift, the patient did make progress toward the following goals. Pt denies SOB and remains on RA.       Problem: Safety  Goal: I will remain free of falls  Outcome: Progressing     Problem: Daily Care  Goal: Daily care needs are met  Outcome: Progressing

## 2024-02-27 NOTE — HOSPITAL COURSE
"Alvaro Christensen is a 68 y.o. male with PMH of HTN, Chronic back pain s/p bilateral SIJ  injections presenting with submandibular area swelling. CT neck showed Extensive edema within the subcutaneous fat and bilateral submandibular/submental spaces with extension into the mucosa of the oropharynx and supraglottic larynx. Patient was also found to be flu positive.  Was seen by ENT in the ED and they are described as \"watery edema\" .  Patient was transferred to the MICU for close airway monitoring.  Patient received steroids, antihistamines, and H2 blockers for suspected angioedema and ACE inhibitor's were placed on his allergy list.   Patient was continued on cefepime and metronidazole for suspected sarita angina.  "

## 2024-02-27 NOTE — PROGRESS NOTES
SOCIAL WORK NOTE   SW met with patient, SO, and daughter at bedside for assessment (please see flowsheets for further details). Patient normally lives at home with SO and is independent without DME or HC at baseline. He reports that his PCP retired and is not sure if he has been assigned a new one. Currently denied needs for social work. Social work to follow.   DALIA Zee, RYANW-S ([id number])

## 2024-02-27 NOTE — CARE PLAN
The patient's goals for the shift include      The clinical goals for the shift include stay comfortable    Over the shift, the patient did make progress toward the following goals.

## 2024-02-28 LAB
ALBUMIN SERPL BCP-MCNC: 3.6 G/DL (ref 3.4–5)
ALP SERPL-CCNC: 61 U/L (ref 33–136)
ALT SERPL W P-5'-P-CCNC: 25 U/L (ref 10–52)
ANION GAP SERPL CALC-SCNC: 13 MMOL/L (ref 10–20)
AST SERPL W P-5'-P-CCNC: 23 U/L (ref 9–39)
BASOPHILS # BLD AUTO: 0.03 X10*3/UL (ref 0–0.1)
BASOPHILS NFR BLD AUTO: 0.4 %
BILIRUB SERPL-MCNC: 0.5 MG/DL (ref 0–1.2)
BUN SERPL-MCNC: 24 MG/DL (ref 6–23)
CALCIUM SERPL-MCNC: 8.6 MG/DL (ref 8.6–10.6)
CHLORIDE SERPL-SCNC: 104 MMOL/L (ref 98–107)
CO2 SERPL-SCNC: 26 MMOL/L (ref 21–32)
CREAT SERPL-MCNC: 0.98 MG/DL (ref 0.5–1.3)
EGFRCR SERPLBLD CKD-EPI 2021: 85 ML/MIN/1.73M*2
EOSINOPHIL # BLD AUTO: 0.05 X10*3/UL (ref 0–0.7)
EOSINOPHIL NFR BLD AUTO: 0.7 %
ERYTHROCYTE [DISTWIDTH] IN BLOOD BY AUTOMATED COUNT: 12.5 % (ref 11.5–14.5)
GLUCOSE BLD MANUAL STRIP-MCNC: 108 MG/DL (ref 74–99)
GLUCOSE BLD MANUAL STRIP-MCNC: 117 MG/DL (ref 74–99)
GLUCOSE BLD MANUAL STRIP-MCNC: 122 MG/DL (ref 74–99)
GLUCOSE BLD MANUAL STRIP-MCNC: 130 MG/DL (ref 74–99)
GLUCOSE BLD MANUAL STRIP-MCNC: 154 MG/DL (ref 74–99)
GLUCOSE SERPL-MCNC: 122 MG/DL (ref 74–99)
HCT VFR BLD AUTO: 38.9 % (ref 41–52)
HGB BLD-MCNC: 12.9 G/DL (ref 13.5–17.5)
IMM GRANULOCYTES # BLD AUTO: 0.03 X10*3/UL (ref 0–0.7)
IMM GRANULOCYTES NFR BLD AUTO: 0.4 % (ref 0–0.9)
LYMPHOCYTES # BLD AUTO: 2.1 X10*3/UL (ref 1.2–4.8)
LYMPHOCYTES NFR BLD AUTO: 28.5 %
MAGNESIUM SERPL-MCNC: 2.12 MG/DL (ref 1.6–2.4)
MCH RBC QN AUTO: 29.7 PG (ref 26–34)
MCHC RBC AUTO-ENTMCNC: 33.2 G/DL (ref 32–36)
MCV RBC AUTO: 89 FL (ref 80–100)
MONOCYTES # BLD AUTO: 0.62 X10*3/UL (ref 0.1–1)
MONOCYTES NFR BLD AUTO: 8.4 %
NEUTROPHILS # BLD AUTO: 4.54 X10*3/UL (ref 1.2–7.7)
NEUTROPHILS NFR BLD AUTO: 61.6 %
NRBC BLD-RTO: 0 /100 WBCS (ref 0–0)
PHOSPHATE SERPL-MCNC: 2.5 MG/DL (ref 2.5–4.9)
PLATELET # BLD AUTO: 231 X10*3/UL (ref 150–450)
POTASSIUM SERPL-SCNC: 3.6 MMOL/L (ref 3.5–5.3)
PROT SERPL-MCNC: 6 G/DL (ref 6.4–8.2)
RBC # BLD AUTO: 4.35 X10*6/UL (ref 4.5–5.9)
SODIUM SERPL-SCNC: 139 MMOL/L (ref 136–145)
STAPHYLOCOCCUS SPEC CULT: NORMAL
WBC # BLD AUTO: 7.4 X10*3/UL (ref 4.4–11.3)

## 2024-02-28 PROCEDURE — 2500000001 HC RX 250 WO HCPCS SELF ADMINISTERED DRUGS (ALT 637 FOR MEDICARE OP): Performed by: STUDENT IN AN ORGANIZED HEALTH CARE EDUCATION/TRAINING PROGRAM

## 2024-02-28 PROCEDURE — 84100 ASSAY OF PHOSPHORUS: CPT | Performed by: STUDENT IN AN ORGANIZED HEALTH CARE EDUCATION/TRAINING PROGRAM

## 2024-02-28 PROCEDURE — 36415 COLL VENOUS BLD VENIPUNCTURE: CPT | Performed by: STUDENT IN AN ORGANIZED HEALTH CARE EDUCATION/TRAINING PROGRAM

## 2024-02-28 PROCEDURE — 83735 ASSAY OF MAGNESIUM: CPT | Performed by: STUDENT IN AN ORGANIZED HEALTH CARE EDUCATION/TRAINING PROGRAM

## 2024-02-28 PROCEDURE — 85025 COMPLETE CBC W/AUTO DIFF WBC: CPT | Performed by: STUDENT IN AN ORGANIZED HEALTH CARE EDUCATION/TRAINING PROGRAM

## 2024-02-28 PROCEDURE — 2500000002 HC RX 250 W HCPCS SELF ADMINISTERED DRUGS (ALT 637 FOR MEDICARE OP, ALT 636 FOR OP/ED): Performed by: STUDENT IN AN ORGANIZED HEALTH CARE EDUCATION/TRAINING PROGRAM

## 2024-02-28 PROCEDURE — G0378 HOSPITAL OBSERVATION PER HR: HCPCS

## 2024-02-28 PROCEDURE — 2500000004 HC RX 250 GENERAL PHARMACY W/ HCPCS (ALT 636 FOR OP/ED): Performed by: STUDENT IN AN ORGANIZED HEALTH CARE EDUCATION/TRAINING PROGRAM

## 2024-02-28 PROCEDURE — 82947 ASSAY GLUCOSE BLOOD QUANT: CPT

## 2024-02-28 PROCEDURE — 80053 COMPREHEN METABOLIC PANEL: CPT | Performed by: STUDENT IN AN ORGANIZED HEALTH CARE EDUCATION/TRAINING PROGRAM

## 2024-02-28 PROCEDURE — 99232 SBSQ HOSP IP/OBS MODERATE 35: CPT | Performed by: STUDENT IN AN ORGANIZED HEALTH CARE EDUCATION/TRAINING PROGRAM

## 2024-02-28 RX ADMIN — OSELTAMIVIR PHOSPHATE 75 MG: 75 CAPSULE ORAL at 20:27

## 2024-02-28 RX ADMIN — ENOXAPARIN SODIUM 40 MG: 100 INJECTION SUBCUTANEOUS at 20:25

## 2024-02-28 RX ADMIN — BENZONATATE 100 MG: 100 CAPSULE ORAL at 08:22

## 2024-02-28 RX ADMIN — MELOXICAM 15 MG: 15 TABLET ORAL at 08:22

## 2024-02-28 RX ADMIN — OXYCODONE HYDROCHLORIDE 10 MG: 5 TABLET ORAL at 13:11

## 2024-02-28 RX ADMIN — PANTOPRAZOLE SODIUM 40 MG: 40 TABLET, DELAYED RELEASE ORAL at 08:22

## 2024-02-28 RX ADMIN — OSELTAMIVIR PHOSPHATE 75 MG: 75 CAPSULE ORAL at 08:22

## 2024-02-28 RX ADMIN — ASPIRIN 81 MG: 81 TABLET, COATED ORAL at 08:22

## 2024-02-28 ASSESSMENT — COGNITIVE AND FUNCTIONAL STATUS - GENERAL
MOBILITY SCORE: 24
DAILY ACTIVITIY SCORE: 24
DAILY ACTIVITIY SCORE: 24
MOBILITY SCORE: 24

## 2024-02-28 ASSESSMENT — PAIN SCALES - GENERAL
PAINLEVEL_OUTOF10: 0 - NO PAIN
PAINLEVEL_OUTOF10: 8

## 2024-02-28 ASSESSMENT — PAIN DESCRIPTION - ORIENTATION: ORIENTATION: LOWER

## 2024-02-28 ASSESSMENT — PAIN - FUNCTIONAL ASSESSMENT
PAIN_FUNCTIONAL_ASSESSMENT: 0-10
PAIN_FUNCTIONAL_ASSESSMENT: 0-10

## 2024-02-28 ASSESSMENT — PAIN DESCRIPTION - LOCATION: LOCATION: BACK

## 2024-02-28 NOTE — PROGRESS NOTES
"                                                                                                                                                                                                                                                                                             INTERNAL MEDICINE PROGRESS NOTE     BRIEF NARRATIVE    Alvaro Christensen is a 68 y.o. male on day 2 of admission presenting with Submandibular swelling.    Pt has  PMH of HTN, Chronic back pain s/p bilateral SIJ  injections presenting with submandibular area swelling. CT neck showed Extensive edema within the subcutaneous fat and bilateral submandibular/submental spaces with extension into the mucosa of the oropharynx and supraglottic larynx. Patient was also found to be flu positive and was started on tamiflu.  Was seen by ENT in the ED and they are described as \"watery edema\" .  Patient was transferred to the MICU for close airway monitoring.  Patient received steroids, antihistamines, and H2 blockers for suspected angioedema and ACE inhibitor's were placed on his allergy list.   Patient received on cefepime and metronidazole for suspected sarita angina however now discontinued in light of clinical improvement , and absence of signs and symptoms of infectious cause. On exam this morning patient is satting well on RA, with neck swelling minimal and able to converse clearly.  Steroids, benadryl, and Pepcid discontinued iso improved clinical exam. ENT agreed that patient is stable for transfer to floor.    SUBJECTIVE     Pt seen and examined today, no acute events overnight. Swelling has gone significantly.    OBJECTIVE      Visit Vitals  /81 (BP Location: Left arm, Patient Position: Lying)   Pulse 77   Temp 36.1 °C (97 °F) (Temporal)   Resp 16      No intake or output data in the 24 hours ending 02/28/24 1439    Physical Exam    Constitutional: Well-developed male in no acute distress on 2l NC  HEENT: minimal swelling "   Respiratory: CTA bilaterally. No wheezes, rales, or rhonchi. Normal respiratory effort.  Cardiovascular: RRR. No murmurs, gallops, or rubs. No JVD. Radial pulses 2+.  Abdominal: Soft, nondistended, nontender to palpation. Bowel sounds present. No hepatosplenomegaly or masses. No CVA tenderness.  Neuro: CN II-XII intact. UE and LE strength 5/5 bilaterally and sensation intact. Normal FTN testing.  MSK: No LE edema bilaterally.  Skin: Warm, dry. No rashes or wounds.  Psych: Appropriate mood and affect.  Difficult airway? No  Lines/drains assessed for removal? No    Current Meds   aspirin, 81 mg, oral, Daily  enoxaparin, 40 mg, subcutaneous, q24h  insulin lispro, 0-10 Units, subcutaneous, TID with meals  meloxicam, 15 mg, oral, Daily  oseltamivir, 75 mg, oral, BID  pantoprazole, 40 mg, oral, Daily before breakfast       PRN medications: acetaminophen **OR** acetaminophen **OR** acetaminophen, benzonatate, dextrose 10 % in water (D10W), dextrose, diclofenac sodium, glucagon, oxyCODONE, oxygen     LABS and IMAGING     WBC   Date Value Ref Range Status   02/28/2024 7.4 4.4 - 11.3 x10*3/uL Final   02/27/2024 8.8 4.4 - 11.3 x10*3/uL Final   02/27/2024 8.6 4.4 - 11.3 x10*3/uL Final     Hemoglobin   Date Value Ref Range Status   02/28/2024 12.9 (L) 13.5 - 17.5 g/dL Final   02/27/2024 12.7 (L) 13.5 - 17.5 g/dL Final   02/27/2024 13.3 (L) 13.5 - 17.5 g/dL Final     Hematocrit   Date Value Ref Range Status   02/28/2024 38.9 (L) 41.0 - 52.0 % Final   02/27/2024 35.1 (L) 41.0 - 52.0 % Final   02/27/2024 39.1 (L) 41.0 - 52.0 % Final     Bicarbonate   Date Value Ref Range Status   02/28/2024 26 21 - 32 mmol/L Final   02/27/2024 25 21 - 32 mmol/L Final   02/27/2024 20 (L) 21 - 32 mmol/L Final     Creatinine   Date Value Ref Range Status   02/28/2024 0.98 0.50 - 1.30 mg/dL Final   02/27/2024 0.88 0.50 - 1.30 mg/dL Final   02/27/2024 1.08 0.50 - 1.30 mg/dL Final     Calcium   Date Value Ref Range Status   02/28/2024 8.6 8.6 - 10.6  mg/dL Final   02/27/2024 8.3 (L) 8.6 - 10.6 mg/dL Final   02/27/2024 7.0 (L) 8.6 - 10.6 mg/dL Final       XR chest 1 view  Narrative: Interpreted By:  Francesca Olivas and Meyers Emily   STUDY:  XR CHEST 1 VIEW;  2/26/2024 6:28 pm      INDICATION:  Signs/Symptoms:Pneumo.      COMPARISON:  Chest radiograph 07/17/2020      ACCESSION NUMBER(S):  [id number]      ORDERING CLINICIAN:  SORAYA HA      FINDINGS:  AP radiograph of the chest was provided.      CARDIOMEDIASTINAL SILHOUETTE:  Cardiomediastinal silhouette is stable in size and configuration.      LUNGS:  Slightly decreased lung volumes with resultant bronchovascular  crowding. Mild blunting of the left costophrenic angle with  associated streaky, linear retrocardiac and basilar opacities, which  may represent associated basilar atelectasis. No focal consolidation  or sizable pneumothorax.      ABDOMEN:  No remarkable upper abdominal findings.      BONES:  No acute osseous changes.      Impression: 1. Small left pleural effusion with associated basilar atelectasis.  2. No sizable pneumothorax.      I personally reviewed the images/study, and I agree with the findings  as stated above. This study was interpreted at [facility], [place], [place].      MACRO:  None      Signed by: Francesca Olivas 2/27/2024 11:42 AM  Dictation workstation:   [id number]       ASSESSMENT / PLANS      #angioedema   #sialadenitis   - ENT consulted: recommended continuing dc steroids  -Received decadron 10mg, pepcid 20mg, bendryl 25mg in ED  -recieved decadron 4mg q8 for 3 doses,  pepcid 20mg x2, benadryl 25mg x3 in ICU  - warm compress  -ensure adequate hydration  - sialagogues   - OK for diet  -Will monitor for 24 h more then dc     # HTN  - DC lisinopril  - place lisinopril on allergy list  - will chose alternative medication ( can consider amlodipine)     # positive for flu  -on tamiflu (2/26-      # concern for sarita angina  - received  cefepime/ Flagyl in ED  -dc Cefepime/Flagyl   - Procal, CXR Negative     # chronic back pain  :: s/p repeat bilateral SIJ  injections  - tylenol Prn     #steroid induced hyperglycemia  #Pre-daibetes  -HBa1C 6.1: consider metformin on dc  -SSI      Dispo: Will observe closely for 24h and discharge in the am    Yohan Lopez MD

## 2024-02-28 NOTE — CARE PLAN
The patient's goals for the shift include      The clinical goals for the shift include Patient will remain free from falls, by the end of this shift.    Patient remained free from injuries during this shift.    Patient is in be: Supine, HOB elevated, call light within reach.    Plan of care ongoing.        Problem: Pain  Goal: My pain/discomfort is manageable  Outcome: Progressing     Problem: Safety  Goal: Patient will be injury free during hospitalization  Outcome: Progressing  Goal: I will remain free of falls  Outcome: Progressing     Problem: Daily Care  Goal: Daily care needs are met  Outcome: Progressing     Problem: Psychosocial Needs  Goal: Demonstrates ability to cope with hospitalization/illness  Outcome: Progressing  Goal: Collaborate with me, my family, and caregiver to identify my specific goals  Outcome: Progressing     Problem: Discharge Barriers  Goal: My discharge needs are met  Outcome: Progressing

## 2024-02-28 NOTE — CARE PLAN
The clinical goals for the shift include pt linda show no s/sx of SOB    Over the shift, the patient did make progress toward the following goals.     Problem: Daily Care  Goal: Daily care needs are met  Outcome: Progressing     Problem: Pain  Goal: My pain/discomfort is manageable  Outcome: Progressing

## 2024-02-29 ENCOUNTER — PHARMACY VISIT (OUTPATIENT)
Dept: PHARMACY | Facility: CLINIC | Age: 69
End: 2024-02-29
Payer: MEDICARE

## 2024-02-29 VITALS
HEIGHT: 71 IN | OXYGEN SATURATION: 94 % | SYSTOLIC BLOOD PRESSURE: 124 MMHG | RESPIRATION RATE: 18 BRPM | BODY MASS INDEX: 33.64 KG/M2 | TEMPERATURE: 97 F | WEIGHT: 240.3 LBS | DIASTOLIC BLOOD PRESSURE: 82 MMHG | HEART RATE: 62 BPM

## 2024-02-29 LAB
ALBUMIN SERPL BCP-MCNC: 3.8 G/DL (ref 3.4–5)
ALP SERPL-CCNC: 64 U/L (ref 33–136)
ALT SERPL W P-5'-P-CCNC: 43 U/L (ref 10–52)
ANION GAP SERPL CALC-SCNC: 14 MMOL/L (ref 10–20)
AST SERPL W P-5'-P-CCNC: 39 U/L (ref 9–39)
BASOPHILS # BLD AUTO: 0.04 X10*3/UL (ref 0–0.1)
BASOPHILS NFR BLD AUTO: 0.7 %
BILIRUB SERPL-MCNC: 0.6 MG/DL (ref 0–1.2)
BUN SERPL-MCNC: 21 MG/DL (ref 6–23)
CALCIUM SERPL-MCNC: 8.7 MG/DL (ref 8.6–10.6)
CHLORIDE SERPL-SCNC: 102 MMOL/L (ref 98–107)
CO2 SERPL-SCNC: 27 MMOL/L (ref 21–32)
CREAT SERPL-MCNC: 0.85 MG/DL (ref 0.5–1.3)
EGFRCR SERPLBLD CKD-EPI 2021: >90 ML/MIN/1.73M*2
EOSINOPHIL # BLD AUTO: 0.19 X10*3/UL (ref 0–0.7)
EOSINOPHIL NFR BLD AUTO: 3.3 %
ERYTHROCYTE [DISTWIDTH] IN BLOOD BY AUTOMATED COUNT: 12.5 % (ref 11.5–14.5)
GLUCOSE BLD MANUAL STRIP-MCNC: 105 MG/DL (ref 74–99)
GLUCOSE BLD MANUAL STRIP-MCNC: 97 MG/DL (ref 74–99)
GLUCOSE SERPL-MCNC: 99 MG/DL (ref 74–99)
HCT VFR BLD AUTO: 40.3 % (ref 41–52)
HGB BLD-MCNC: 13.5 G/DL (ref 13.5–17.5)
IMM GRANULOCYTES # BLD AUTO: 0.04 X10*3/UL (ref 0–0.7)
IMM GRANULOCYTES NFR BLD AUTO: 0.7 % (ref 0–0.9)
LYMPHOCYTES # BLD AUTO: 2.27 X10*3/UL (ref 1.2–4.8)
LYMPHOCYTES NFR BLD AUTO: 39.4 %
MAGNESIUM SERPL-MCNC: 2 MG/DL (ref 1.6–2.4)
MCH RBC QN AUTO: 29.6 PG (ref 26–34)
MCHC RBC AUTO-ENTMCNC: 33.5 G/DL (ref 32–36)
MCV RBC AUTO: 88 FL (ref 80–100)
MONOCYTES # BLD AUTO: 0.58 X10*3/UL (ref 0.1–1)
MONOCYTES NFR BLD AUTO: 10.1 %
NEUTROPHILS # BLD AUTO: 2.64 X10*3/UL (ref 1.2–7.7)
NEUTROPHILS NFR BLD AUTO: 45.8 %
NRBC BLD-RTO: 0 /100 WBCS (ref 0–0)
PHOSPHATE SERPL-MCNC: 3.2 MG/DL (ref 2.5–4.9)
PLATELET # BLD AUTO: 246 X10*3/UL (ref 150–450)
POTASSIUM SERPL-SCNC: 4 MMOL/L (ref 3.5–5.3)
PROT SERPL-MCNC: 6.3 G/DL (ref 6.4–8.2)
RBC # BLD AUTO: 4.56 X10*6/UL (ref 4.5–5.9)
SODIUM SERPL-SCNC: 139 MMOL/L (ref 136–145)
WBC # BLD AUTO: 5.8 X10*3/UL (ref 4.4–11.3)

## 2024-02-29 PROCEDURE — G0378 HOSPITAL OBSERVATION PER HR: HCPCS

## 2024-02-29 PROCEDURE — 2500000001 HC RX 250 WO HCPCS SELF ADMINISTERED DRUGS (ALT 637 FOR MEDICARE OP): Performed by: STUDENT IN AN ORGANIZED HEALTH CARE EDUCATION/TRAINING PROGRAM

## 2024-02-29 PROCEDURE — 99239 HOSP IP/OBS DSCHRG MGMT >30: CPT | Performed by: STUDENT IN AN ORGANIZED HEALTH CARE EDUCATION/TRAINING PROGRAM

## 2024-02-29 PROCEDURE — 84100 ASSAY OF PHOSPHORUS: CPT | Performed by: STUDENT IN AN ORGANIZED HEALTH CARE EDUCATION/TRAINING PROGRAM

## 2024-02-29 PROCEDURE — 36415 COLL VENOUS BLD VENIPUNCTURE: CPT | Performed by: STUDENT IN AN ORGANIZED HEALTH CARE EDUCATION/TRAINING PROGRAM

## 2024-02-29 PROCEDURE — 82947 ASSAY GLUCOSE BLOOD QUANT: CPT

## 2024-02-29 PROCEDURE — RXMED WILLOW AMBULATORY MEDICATION CHARGE

## 2024-02-29 PROCEDURE — 83735 ASSAY OF MAGNESIUM: CPT | Performed by: STUDENT IN AN ORGANIZED HEALTH CARE EDUCATION/TRAINING PROGRAM

## 2024-02-29 PROCEDURE — 85025 COMPLETE CBC W/AUTO DIFF WBC: CPT | Performed by: STUDENT IN AN ORGANIZED HEALTH CARE EDUCATION/TRAINING PROGRAM

## 2024-02-29 PROCEDURE — 2500000002 HC RX 250 W HCPCS SELF ADMINISTERED DRUGS (ALT 637 FOR MEDICARE OP, ALT 636 FOR OP/ED): Performed by: STUDENT IN AN ORGANIZED HEALTH CARE EDUCATION/TRAINING PROGRAM

## 2024-02-29 PROCEDURE — 80053 COMPREHEN METABOLIC PANEL: CPT | Performed by: STUDENT IN AN ORGANIZED HEALTH CARE EDUCATION/TRAINING PROGRAM

## 2024-02-29 RX ORDER — BENZONATATE 100 MG/1
100 CAPSULE ORAL 3 TIMES DAILY PRN
Qty: 20 CAPSULE | Refills: 0 | Status: ON HOLD | OUTPATIENT
Start: 2024-02-29 | End: 2024-04-24 | Stop reason: ALTCHOICE

## 2024-02-29 RX ORDER — OSELTAMIVIR PHOSPHATE 75 MG/1
75 CAPSULE ORAL 2 TIMES DAILY
Qty: 6 CAPSULE | Refills: 0 | Status: SHIPPED | OUTPATIENT
Start: 2024-02-29 | End: 2024-03-03

## 2024-02-29 RX ORDER — AMLODIPINE BESYLATE 10 MG/1
10 TABLET ORAL DAILY
Qty: 30 TABLET | Refills: 0 | Status: SHIPPED | OUTPATIENT
Start: 2024-02-29 | End: 2024-04-24 | Stop reason: HOSPADM

## 2024-02-29 RX ADMIN — MELOXICAM 15 MG: 15 TABLET ORAL at 08:45

## 2024-02-29 RX ADMIN — ASPIRIN 81 MG: 81 TABLET, COATED ORAL at 08:45

## 2024-02-29 RX ADMIN — OXYCODONE HYDROCHLORIDE 10 MG: 5 TABLET ORAL at 08:45

## 2024-02-29 RX ADMIN — OSELTAMIVIR PHOSPHATE 75 MG: 75 CAPSULE ORAL at 08:45

## 2024-02-29 RX ADMIN — PANTOPRAZOLE SODIUM 40 MG: 40 TABLET, DELAYED RELEASE ORAL at 08:45

## 2024-02-29 ASSESSMENT — COGNITIVE AND FUNCTIONAL STATUS - GENERAL
MOBILITY SCORE: 24
DAILY ACTIVITIY SCORE: 24

## 2024-02-29 ASSESSMENT — PAIN - FUNCTIONAL ASSESSMENT
PAIN_FUNCTIONAL_ASSESSMENT: 0-10

## 2024-02-29 ASSESSMENT — PAIN SCALES - GENERAL
PAINLEVEL_OUTOF10: 8
PAINLEVEL_OUTOF10: 8
PAINLEVEL_OUTOF10: 0 - NO PAIN

## 2024-02-29 ASSESSMENT — PAIN DESCRIPTION - LOCATION: LOCATION: HIP

## 2024-02-29 NOTE — DISCHARGE SUMMARY
"                                                   INTERNAL MEDICINE DISCHARGE SUMMARY             Discharge Diagnosis   Submandibular swelling    Issues Requiring Follow-Up   None    Test Results Pending At Discharge     Pending Labs       No current pending labs.          Hospital Course     Alvaro Christensen is a 68 y.o. male with PMH of HTN, Chronic back pain s/p bilateral SIJ  injections presenting with submandibular area swelling. CT neck showed Extensive edema within the subcutaneous fat and bilateral submandibular/submental spaces with extension into the mucosa of the oropharynx and supraglottic larynx. Patient was also found to be flu positive.  Was seen by ENT in the ED and they are described as \"watery edema\" .  Patient was transferred to the MICU for close airway monitoring.  Patient received steroids, antihistamines, and H2 blockers for suspected angioedema and ACE inhibitor's were placed on his allergy list.   Patient was continued on cefepime and metronidazole for suspected sarita angina. Steroids, benadryl, and Pepcid discontinued iso improved clinical exam. ENT agreed that patient is stable for transfer to floor. Pt was closely monitor on medicine floor for another 24 hours. Lisinopril was listed as allergy     Pertinent Physical Exam At Time of Discharge     Physical Exam  Constitutional: Well-developed male in no acute distress on 2l NC  HEENT: minimal swelling   Respiratory: CTA bilaterally. No wheezes, rales, or rhonchi. Normal respiratory effort.  Cardiovascular: RRR.   Abdominal: Soft, nondistended, nontender to palpation.  Neuro: CN II-XII intact.   MSK: No LE edema bilaterally.  Skin: Warm, dry. No rashes or wounds.  Psych: Appropriate mood and affect.    Home Medications        Medication List      START taking these medications     amLODIPine 10 mg tablet; Commonly known as: Norvasc; Take 1 tablet (10   mg) by mouth once daily.   benzonatate 100 mg capsule; Commonly known as: Tessalon; " Take 1 capsule   (100 mg) by mouth 3 times a day as needed for cough. Do not crush or chew.   oseltamivir 75 mg capsule; Commonly known as: Tamiflu; Take 1 capsule   (75 mg) by mouth 2 times a day for 3 days.     CONTINUE taking these medications     aspirin 81 mg EC tablet   diclofenac sodium 1 % gel; Commonly known as: Voltaren   lansoprazole 15 mg DR capsule; Commonly known as: Prevacid   meloxicam 15 mg tablet; Commonly known as: Mobic   oxyCODONE 10 mg immediate release tablet; Commonly known as: Roxicodone     STOP taking these medications     lisinopriL-hydrochlorothiazide 10-12.5 mg tablet     Outpatient Follow-Up     Future Appointments   Date Time Provider Department Center   3/7/2024 11:00 AM [facility] 1 HIGINIO Lopez MD

## 2024-03-01 LAB
BACTERIA BLD CULT: NORMAL
BACTERIA BLD CULT: NORMAL

## 2024-03-07 ENCOUNTER — HOSPITAL ENCOUNTER (OUTPATIENT)
Dept: RADIOLOGY | Facility: HOSPITAL | Age: 69
Discharge: HOME | End: 2024-03-07
Payer: COMMERCIAL

## 2024-03-07 VITALS
HEART RATE: 76 BPM | SYSTOLIC BLOOD PRESSURE: 122 MMHG | OXYGEN SATURATION: 93 % | DIASTOLIC BLOOD PRESSURE: 80 MMHG | RESPIRATION RATE: 16 BRPM

## 2024-03-07 DIAGNOSIS — R07.2 PRECORDIAL CHEST PAIN: ICD-10-CM

## 2024-03-07 PROCEDURE — 2550000001 HC RX 255 CONTRASTS: Performed by: INTERNAL MEDICINE

## 2024-03-07 PROCEDURE — 2500000005 HC RX 250 GENERAL PHARMACY W/O HCPCS: Performed by: STUDENT IN AN ORGANIZED HEALTH CARE EDUCATION/TRAINING PROGRAM

## 2024-03-07 PROCEDURE — 2500000001 HC RX 250 WO HCPCS SELF ADMINISTERED DRUGS (ALT 637 FOR MEDICARE OP): Performed by: STUDENT IN AN ORGANIZED HEALTH CARE EDUCATION/TRAINING PROGRAM

## 2024-03-07 PROCEDURE — 75574 CT ANGIO HRT W/3D IMAGE: CPT | Mod: RSC

## 2024-03-07 RX ORDER — METOPROLOL TARTRATE 1 MG/ML
5 INJECTION, SOLUTION INTRAVENOUS ONCE AS NEEDED
Status: DISCONTINUED | OUTPATIENT
Start: 2024-03-07 | End: 2024-03-08 | Stop reason: HOSPADM

## 2024-03-07 RX ORDER — NITROGLYCERIN 0.4 MG/1
0.8 TABLET SUBLINGUAL ONCE
Status: COMPLETED | OUTPATIENT
Start: 2024-03-07 | End: 2024-03-07

## 2024-03-07 RX ORDER — LORAZEPAM 2 MG/ML
0.5 INJECTION INTRAMUSCULAR EVERY 5 MIN PRN
Status: DISCONTINUED | OUTPATIENT
Start: 2024-03-07 | End: 2024-03-08 | Stop reason: HOSPADM

## 2024-03-07 RX ORDER — METOPROLOL TARTRATE 1 MG/ML
5 INJECTION, SOLUTION INTRAVENOUS ONCE
Status: COMPLETED | OUTPATIENT
Start: 2024-03-07 | End: 2024-03-07

## 2024-03-07 RX ORDER — METOPROLOL TARTRATE 50 MG/1
100 TABLET ORAL ONCE AS NEEDED
Status: DISCONTINUED | OUTPATIENT
Start: 2024-03-07 | End: 2024-03-08 | Stop reason: HOSPADM

## 2024-03-07 RX ORDER — METOPROLOL TARTRATE 50 MG/1
100 TABLET ORAL ONCE
Status: DISCONTINUED | OUTPATIENT
Start: 2024-03-07 | End: 2024-03-08 | Stop reason: HOSPADM

## 2024-03-07 RX ADMIN — METOPROLOL TARTRATE 5 MG: 5 INJECTION, SOLUTION INTRAVENOUS at 11:36

## 2024-03-07 RX ADMIN — NITROGLYCERIN 0.8 MG: 0.4 TABLET SUBLINGUAL at 11:54

## 2024-03-07 RX ADMIN — IOHEXOL 75 ML: 350 INJECTION, SOLUTION INTRAVENOUS at 11:53

## 2024-04-23 ENCOUNTER — HOSPITAL ENCOUNTER (OUTPATIENT)
Facility: HOSPITAL | Age: 69
Setting detail: OBSERVATION
Discharge: HOME | End: 2024-04-24
Attending: STUDENT IN AN ORGANIZED HEALTH CARE EDUCATION/TRAINING PROGRAM | Admitting: INTERNAL MEDICINE
Payer: COMMERCIAL

## 2024-04-23 ENCOUNTER — APPOINTMENT (OUTPATIENT)
Dept: RADIOLOGY | Facility: HOSPITAL | Age: 69
End: 2024-04-23
Payer: COMMERCIAL

## 2024-04-23 ENCOUNTER — APPOINTMENT (OUTPATIENT)
Dept: CARDIOLOGY | Facility: HOSPITAL | Age: 69
End: 2024-04-23
Payer: COMMERCIAL

## 2024-04-23 DIAGNOSIS — I20.89 ANGINAL EQUIVALENT (CMS-HCC): ICD-10-CM

## 2024-04-23 DIAGNOSIS — R06.00 DYSPNEA, UNSPECIFIED TYPE: ICD-10-CM

## 2024-04-23 DIAGNOSIS — I25.110 ATHEROSCLEROTIC HEART DISEASE OF NATIVE CORONARY ARTERY WITH UNSTABLE ANGINA PECTORIS (MULTI): ICD-10-CM

## 2024-04-23 DIAGNOSIS — Z87.891 FORMER SMOKER: ICD-10-CM

## 2024-04-23 DIAGNOSIS — R79.89 ELEVATED D-DIMER: ICD-10-CM

## 2024-04-23 DIAGNOSIS — R07.9 CHEST PAIN, UNSPECIFIED TYPE: Primary | ICD-10-CM

## 2024-04-23 LAB
ALBUMIN SERPL BCP-MCNC: 4.6 G/DL (ref 3.4–5)
ALP SERPL-CCNC: 76 U/L (ref 33–136)
ALT SERPL W P-5'-P-CCNC: 29 U/L (ref 10–52)
ANION GAP SERPL CALC-SCNC: 17 MMOL/L (ref 10–20)
APTT PPP: 27 SECONDS (ref 27–38)
AST SERPL W P-5'-P-CCNC: 23 U/L (ref 9–39)
BASOPHILS # BLD AUTO: 0.08 X10*3/UL (ref 0–0.1)
BASOPHILS NFR BLD AUTO: 1 %
BILIRUB SERPL-MCNC: 0.5 MG/DL (ref 0–1.2)
BNP SERPL-MCNC: 27 PG/ML (ref 0–99)
BUN SERPL-MCNC: 12 MG/DL (ref 6–23)
CALCIUM SERPL-MCNC: 8.9 MG/DL (ref 8.6–10.3)
CARDIAC TROPONIN I PNL SERPL HS: 4 NG/L (ref 0–20)
CARDIAC TROPONIN I PNL SERPL HS: 5 NG/L (ref 0–20)
CHLORIDE SERPL-SCNC: 101 MMOL/L (ref 98–107)
CO2 SERPL-SCNC: 26 MMOL/L (ref 21–32)
CREAT SERPL-MCNC: 1.1 MG/DL (ref 0.5–1.3)
D DIMER PPP FEU-MCNC: 546 NG/ML FEU
EGFRCR SERPLBLD CKD-EPI 2021: 74 ML/MIN/1.73M*2
EOSINOPHIL # BLD AUTO: 0.33 X10*3/UL (ref 0–0.7)
EOSINOPHIL NFR BLD AUTO: 4.1 %
ERYTHROCYTE [DISTWIDTH] IN BLOOD BY AUTOMATED COUNT: 12.6 % (ref 11.5–14.5)
GLUCOSE SERPL-MCNC: 94 MG/DL (ref 74–99)
HCT VFR BLD AUTO: 42.9 % (ref 41–52)
HGB BLD-MCNC: 14.2 G/DL (ref 13.5–17.5)
IMM GRANULOCYTES # BLD AUTO: 0.01 X10*3/UL (ref 0–0.7)
IMM GRANULOCYTES NFR BLD AUTO: 0.1 % (ref 0–0.9)
INR PPP: 1.1 (ref 0.9–1.1)
LACTATE SERPL-SCNC: 0.9 MMOL/L (ref 0.4–2)
LIPASE SERPL-CCNC: 40 U/L (ref 9–82)
LYMPHOCYTES # BLD AUTO: 2.75 X10*3/UL (ref 1.2–4.8)
LYMPHOCYTES NFR BLD AUTO: 34.2 %
MAGNESIUM SERPL-MCNC: 1.91 MG/DL (ref 1.6–2.4)
MCH RBC QN AUTO: 29.4 PG (ref 26–34)
MCHC RBC AUTO-ENTMCNC: 33.1 G/DL (ref 32–36)
MCV RBC AUTO: 89 FL (ref 80–100)
MONOCYTES # BLD AUTO: 0.8 X10*3/UL (ref 0.1–1)
MONOCYTES NFR BLD AUTO: 10 %
NEUTROPHILS # BLD AUTO: 4.07 X10*3/UL (ref 1.2–7.7)
NEUTROPHILS NFR BLD AUTO: 50.6 %
NRBC BLD-RTO: 0 /100 WBCS (ref 0–0)
PLATELET # BLD AUTO: 257 X10*3/UL (ref 150–450)
POTASSIUM SERPL-SCNC: 4.2 MMOL/L (ref 3.5–5.3)
PROT SERPL-MCNC: 6.9 G/DL (ref 6.4–8.2)
PROTHROMBIN TIME: 12.2 SECONDS (ref 9.8–12.8)
RBC # BLD AUTO: 4.83 X10*6/UL (ref 4.5–5.9)
SODIUM SERPL-SCNC: 140 MMOL/L (ref 136–145)
TSH SERPL-ACNC: 2.64 MIU/L (ref 0.44–3.98)
WBC # BLD AUTO: 8 X10*3/UL (ref 4.4–11.3)

## 2024-04-23 PROCEDURE — 93970 EXTREMITY STUDY: CPT | Performed by: RADIOLOGY

## 2024-04-23 PROCEDURE — 85379 FIBRIN DEGRADATION QUANT: CPT | Performed by: NURSE PRACTITIONER

## 2024-04-23 PROCEDURE — 80053 COMPREHEN METABOLIC PANEL: CPT | Performed by: NURSE PRACTITIONER

## 2024-04-23 PROCEDURE — 83735 ASSAY OF MAGNESIUM: CPT | Performed by: NURSE PRACTITIONER

## 2024-04-23 PROCEDURE — 71275 CT ANGIOGRAPHY CHEST: CPT

## 2024-04-23 PROCEDURE — 71275 CT ANGIOGRAPHY CHEST: CPT | Performed by: RADIOLOGY

## 2024-04-23 PROCEDURE — 36415 COLL VENOUS BLD VENIPUNCTURE: CPT | Performed by: NURSE PRACTITIONER

## 2024-04-23 PROCEDURE — 84484 ASSAY OF TROPONIN QUANT: CPT | Performed by: NURSE PRACTITIONER

## 2024-04-23 PROCEDURE — 2550000001 HC RX 255 CONTRASTS: Performed by: STUDENT IN AN ORGANIZED HEALTH CARE EDUCATION/TRAINING PROGRAM

## 2024-04-23 PROCEDURE — 93005 ELECTROCARDIOGRAM TRACING: CPT

## 2024-04-23 PROCEDURE — 2500000004 HC RX 250 GENERAL PHARMACY W/ HCPCS (ALT 636 FOR OP/ED): Performed by: NURSE PRACTITIONER

## 2024-04-23 PROCEDURE — 83605 ASSAY OF LACTIC ACID: CPT | Performed by: NURSE PRACTITIONER

## 2024-04-23 PROCEDURE — 83690 ASSAY OF LIPASE: CPT | Performed by: NURSE PRACTITIONER

## 2024-04-23 PROCEDURE — 96375 TX/PRO/DX INJ NEW DRUG ADDON: CPT | Mod: 59

## 2024-04-23 PROCEDURE — 85025 COMPLETE CBC W/AUTO DIFF WBC: CPT | Performed by: NURSE PRACTITIONER

## 2024-04-23 PROCEDURE — 99285 EMERGENCY DEPT VISIT HI MDM: CPT | Mod: 25

## 2024-04-23 PROCEDURE — 85730 THROMBOPLASTIN TIME PARTIAL: CPT | Performed by: NURSE PRACTITIONER

## 2024-04-23 PROCEDURE — 84443 ASSAY THYROID STIM HORMONE: CPT | Performed by: NURSE PRACTITIONER

## 2024-04-23 PROCEDURE — 96374 THER/PROPH/DIAG INJ IV PUSH: CPT | Mod: 59

## 2024-04-23 PROCEDURE — 93970 EXTREMITY STUDY: CPT

## 2024-04-23 PROCEDURE — 2500000001 HC RX 250 WO HCPCS SELF ADMINISTERED DRUGS (ALT 637 FOR MEDICARE OP): Performed by: NURSE PRACTITIONER

## 2024-04-23 PROCEDURE — 85610 PROTHROMBIN TIME: CPT | Performed by: NURSE PRACTITIONER

## 2024-04-23 PROCEDURE — 83880 ASSAY OF NATRIURETIC PEPTIDE: CPT | Performed by: NURSE PRACTITIONER

## 2024-04-23 PROCEDURE — 99222 1ST HOSP IP/OBS MODERATE 55: CPT | Performed by: NURSE PRACTITIONER

## 2024-04-23 RX ORDER — NAPROXEN SODIUM 220 MG/1
81 TABLET, FILM COATED ORAL DAILY
Status: DISCONTINUED | OUTPATIENT
Start: 2024-04-24 | End: 2024-04-24 | Stop reason: HOSPADM

## 2024-04-23 RX ORDER — MORPHINE SULFATE 4 MG/ML
4 INJECTION INTRAVENOUS ONCE
Status: COMPLETED | OUTPATIENT
Start: 2024-04-23 | End: 2024-04-23

## 2024-04-23 RX ORDER — LORAZEPAM 2 MG/ML
1 INJECTION INTRAMUSCULAR ONCE
Status: COMPLETED | OUTPATIENT
Start: 2024-04-23 | End: 2024-04-23

## 2024-04-23 RX ORDER — NAPROXEN SODIUM 220 MG/1
243 TABLET, FILM COATED ORAL ONCE
Status: COMPLETED | OUTPATIENT
Start: 2024-04-23 | End: 2024-04-23

## 2024-04-23 RX ORDER — ONDANSETRON HYDROCHLORIDE 2 MG/ML
4 INJECTION, SOLUTION INTRAVENOUS ONCE
Status: COMPLETED | OUTPATIENT
Start: 2024-04-23 | End: 2024-04-23

## 2024-04-23 RX ORDER — DIPHENHYDRAMINE HYDROCHLORIDE 50 MG/ML
50 INJECTION INTRAMUSCULAR; INTRAVENOUS ONCE
Status: COMPLETED | OUTPATIENT
Start: 2024-04-23 | End: 2024-04-23

## 2024-04-23 RX ORDER — ENOXAPARIN SODIUM 100 MG/ML
40 INJECTION SUBCUTANEOUS EVERY 24 HOURS
Status: DISCONTINUED | OUTPATIENT
Start: 2024-04-23 | End: 2024-04-24 | Stop reason: HOSPADM

## 2024-04-23 RX ORDER — ATORVASTATIN CALCIUM 80 MG/1
80 TABLET, FILM COATED ORAL NIGHTLY
Status: DISCONTINUED | OUTPATIENT
Start: 2024-04-23 | End: 2024-04-24 | Stop reason: HOSPADM

## 2024-04-23 RX ORDER — DOCUSATE SODIUM 100 MG/1
100 CAPSULE, LIQUID FILLED ORAL 2 TIMES DAILY
Status: DISCONTINUED | OUTPATIENT
Start: 2024-04-23 | End: 2024-04-24 | Stop reason: HOSPADM

## 2024-04-23 RX ADMIN — MORPHINE SULFATE 4 MG: 4 INJECTION INTRAVENOUS at 17:28

## 2024-04-23 RX ADMIN — ONDANSETRON 4 MG: 2 INJECTION INTRAMUSCULAR; INTRAVENOUS at 17:30

## 2024-04-23 RX ADMIN — DIPHENHYDRAMINE HYDROCHLORIDE 50 MG: 50 INJECTION, SOLUTION INTRAMUSCULAR; INTRAVENOUS at 19:21

## 2024-04-23 RX ADMIN — SODIUM CHLORIDE 1000 ML: 9 INJECTION, SOLUTION INTRAVENOUS at 17:35

## 2024-04-23 RX ADMIN — LORAZEPAM 1 MG: 2 INJECTION INTRAMUSCULAR; INTRAVENOUS at 19:20

## 2024-04-23 RX ADMIN — IOHEXOL 78 ML: 350 INJECTION, SOLUTION INTRAVENOUS at 19:43

## 2024-04-23 RX ADMIN — ASPIRIN 81 MG 243 MG: 81 TABLET ORAL at 17:27

## 2024-04-23 ASSESSMENT — PAIN DESCRIPTION - DESCRIPTORS
DESCRIPTORS: STABBING

## 2024-04-23 ASSESSMENT — PAIN DESCRIPTION - PAIN TYPE: TYPE: ACUTE PAIN

## 2024-04-23 ASSESSMENT — PAIN DESCRIPTION - PROGRESSION: CLINICAL_PROGRESSION: GRADUALLY IMPROVING

## 2024-04-23 ASSESSMENT — PAIN DESCRIPTION - LOCATION
LOCATION: CHEST

## 2024-04-23 ASSESSMENT — LIFESTYLE VARIABLES
TOTAL SCORE: 0
EVER FELT BAD OR GUILTY ABOUT YOUR DRINKING: NO
EVER HAD A DRINK FIRST THING IN THE MORNING TO STEADY YOUR NERVES TO GET RID OF A HANGOVER: NO
HAVE YOU EVER FELT YOU SHOULD CUT DOWN ON YOUR DRINKING: NO
HAVE PEOPLE ANNOYED YOU BY CRITICIZING YOUR DRINKING: NO

## 2024-04-23 ASSESSMENT — PAIN SCALES - GENERAL
PAINLEVEL_OUTOF10: 6
PAINLEVEL_OUTOF10: 0 - NO PAIN
PAINLEVEL_OUTOF10: 5 - MODERATE PAIN
PAINLEVEL_OUTOF10: 7
PAINLEVEL_OUTOF10: 6
PAINLEVEL_OUTOF10: 7

## 2024-04-23 ASSESSMENT — HEART SCORE
HEART SCORE: 6
ECG: NON-SPECIFIC REPOLARIZATION DISTURBANCE
RISK FACTORS: >2 RISK FACTORS OR HX OF ATHEROSCLEROTIC DISEASE
AGE: 65+
TROPONIN: LESS THAN OR EQUAL TO NORMAL LIMIT
HISTORY: MODERATELY SUSPICIOUS

## 2024-04-23 ASSESSMENT — PAIN DESCRIPTION - ONSET: ONSET: PROGRESSIVE

## 2024-04-23 ASSESSMENT — PAIN DESCRIPTION - FREQUENCY: FREQUENCY: CONSTANT/CONTINUOUS

## 2024-04-23 ASSESSMENT — PAIN - FUNCTIONAL ASSESSMENT
PAIN_FUNCTIONAL_ASSESSMENT: 0-10

## 2024-04-23 ASSESSMENT — COLUMBIA-SUICIDE SEVERITY RATING SCALE - C-SSRS
1. IN THE PAST MONTH, HAVE YOU WISHED YOU WERE DEAD OR WISHED YOU COULD GO TO SLEEP AND NOT WAKE UP?: NO
6. HAVE YOU EVER DONE ANYTHING, STARTED TO DO ANYTHING, OR PREPARED TO DO ANYTHING TO END YOUR LIFE?: NO
2. HAVE YOU ACTUALLY HAD ANY THOUGHTS OF KILLING YOURSELF?: NO

## 2024-04-23 ASSESSMENT — PAIN DESCRIPTION - ORIENTATION
ORIENTATION: LEFT
ORIENTATION: LEFT

## 2024-04-23 NOTE — ED NOTES
Pt came into the ED today due to he was shoveling stone earlier and he became short of breath and started having chest pain  He said this has been something he has had going on for a couple of years and he recently had a stress test.  He denies any nausea or vomiting.       Rick Jack RN  04/23/24 [id number]

## 2024-04-23 NOTE — ED PROVIDER NOTES
[facility]  [facility]  ED  Encounter Note  Admit Date/RoomTime: 2024  4:58 PM  ED Room: 18/[facility]  NAME: Alvaro Christensen  : 1955  MRN: [id number]     Chief Complaint:  Chest Pain (Started today after shoveling)    HISTORY OF PRESENT ILLNESS        Alvaro Christensen is a 68 y.o. male who presents to the ED for evaluation of chest pain along with chest pain, sob with exertion while on work. He stated that he felt more sob as the day went on. He stated that did just have stress test by Dr Walton 6 months ago. Thought he did okay. He thinks he is on HTN HLD meds but thinks he just had med change. Not really sure. He denied fever chills coughing. He did smoke but does not feel he has copd. No recent trips, travels and no pe history. He does have family history of heart disease.  Never had heart cath.    ROS   Pertinent positives and negatives are stated within HPI, all other systems reviewed and are negative.    Past Medical History:  has a past medical history of Hypertension.    Surgical History:  has a past surgical history that includes Other surgical history (2020) and Other surgical history (2020).    Social History:  reports that he quit smoking about 10 years ago. His smoking use included cigarettes. He has been exposed to tobacco smoke. He has never used smokeless tobacco. He reports that he does not currently use alcohol. He reports that he does not use drugs.    Family History: family history is not on file.     Allergies: Ace inhibitors, Penicillins, and Sulfa (sulfonamide antibiotics)    PHYSICAL EXAM   Oxygen Saturation Interpretation: Normal.        Physical Exam  Constitutional/General: Alert and oriented x3, well appearing, non toxic  HEENT:  NC/NT. PERRLA.  Airway patent.  Neck: Supple, full ROM. No midline vertebral tenderness or crepitus.   Respiratory: Lung sounds clear to auscultation bilaterally. No wheezes, rhonchi or stridor. Not in  respiratory distress.  CV:  Regular rate. Regular rhythm. No murmurs or rubs. 2+ distal pulses.  GI:  Abdomen soft, non-tender, non-distended. +BS. No rebound, guarding, or rigidity. No pulsatile masses.  Musculoskeletal: Moves all extremities x 4. Warm and well perfused. Capillary refill <3 seconds.  Pain with palpitation to upper chest wall but unable to reproduce pain.  Integument: Skin warm and dry. No rashes.   Neurologic: Alert and oriented with no focal deficits, symmetric strength 5/5 in the upper and lower extremities bilaterally.  Psychiatric: Normal affect.    Lab / Imaging Results   (All laboratory and radiology results have been personally reviewed by myself)  Labs:  Results for orders placed or performed during the hospital encounter of 04/23/24   CBC and Auto Differential   Result Value Ref Range    WBC 8.0 4.4 - 11.3 x10*3/uL    nRBC 0.0 0.0 - 0.0 /100 WBCs    RBC 4.83 4.50 - 5.90 x10*6/uL    Hemoglobin 14.2 13.5 - 17.5 g/dL    Hematocrit 42.9 41.0 - 52.0 %    MCV 89 80 - 100 fL    MCH 29.4 26.0 - 34.0 pg    MCHC 33.1 32.0 - 36.0 g/dL    RDW 12.6 11.5 - 14.5 %    Platelets 257 150 - 450 x10*3/uL    Neutrophils % 50.6 40.0 - 80.0 %    Immature Granulocytes %, Automated 0.1 0.0 - 0.9 %    Lymphocytes % 34.2 13.0 - 44.0 %    Monocytes % 10.0 2.0 - 10.0 %    Eosinophils % 4.1 0.0 - 6.0 %    Basophils % 1.0 0.0 - 2.0 %    Neutrophils Absolute 4.07 1.20 - 7.70 x10*3/uL    Immature Granulocytes Absolute, Automated 0.01 0.00 - 0.70 x10*3/uL    Lymphocytes Absolute 2.75 1.20 - 4.80 x10*3/uL    Monocytes Absolute 0.80 0.10 - 1.00 x10*3/uL    Eosinophils Absolute 0.33 0.00 - 0.70 x10*3/uL    Basophils Absolute 0.08 0.00 - 0.10 x10*3/uL   Comprehensive metabolic panel   Result Value Ref Range    Glucose 94 74 - 99 mg/dL    Sodium 140 136 - 145 mmol/L    Potassium 4.2 3.5 - 5.3 mmol/L    Chloride 101 98 - 107 mmol/L    Bicarbonate 26 21 - 32 mmol/L    Anion Gap 17 10 - 20 mmol/L    Urea Nitrogen 12 6 - 23 mg/dL     Creatinine 1.10 0.50 - 1.30 mg/dL    eGFR 74 >60 mL/min/1.73m*2    Calcium 8.9 8.6 - 10.3 mg/dL    Albumin 4.6 3.4 - 5.0 g/dL    Alkaline Phosphatase 76 33 - 136 U/L    Total Protein 6.9 6.4 - 8.2 g/dL    AST 23 9 - 39 U/L    Bilirubin, Total 0.5 0.0 - 1.2 mg/dL    ALT 29 10 - 52 U/L   Magnesium   Result Value Ref Range    Magnesium 1.91 1.60 - 2.40 mg/dL   Lactate   Result Value Ref Range    Lactate 0.9 0.4 - 2.0 mmol/L   Lipase   Result Value Ref Range    Lipase 40 9 - 82 U/L   Protime-INR   Result Value Ref Range    Protime 12.2 9.8 - 12.8 seconds    INR 1.1 0.9 - 1.1   aPTT   Result Value Ref Range    aPTT 27 27 - 38 seconds   B-Type Natriuretic Peptide   Result Value Ref Range    BNP 27 0 - 99 pg/mL   D-Dimer, VTE Exclusion   Result Value Ref Range    D-Dimer, Quantitative VTE Exclusion 546 (H) <=500 ng/mL FEU   TSH with reflex to Free T4 if abnormal   Result Value Ref Range    Thyroid Stimulating Hormone 2.64 0.44 - 3.98 mIU/L   Troponin I, High Sensitivity, Initial   Result Value Ref Range    Troponin I, High Sensitivity 4 0 - 20 ng/L   Troponin, High Sensitivity, 1 Hour   Result Value Ref Range    Troponin I, High Sensitivity 5 0 - 20 ng/L   Lower extremity venous duplex left   Result Value Ref Range    BSA 2.31 m2     Imaging:  All Radiology results interpreted by Radiologist unless otherwise noted.  CT angio chest for pulmonary embolism   Final Result   1. No evidence of acute pulmonary embolism.             MACRO:   None        Signed by: Claudia Roca 4/23/2024 8:29 PM   Dictation workstation:   [id number]      Vascular US lower extremity venous duplex bilateral   Final Result   Negative study.  No sonographic evidence for deep venous thrombosis   identified within the visualized portions of the deep venous system   from the inguinal fossa to bilateral calf veins bilaterally.        Signed by: Claudia Roca 4/23/2024 7:19 PM   Dictation workstation:   [id number]      Lower extremity venous duplex  left             ED Course / Medical Decision Making     Medications   oxygen (O2) therapy (has no administration in time range)   enoxaparin (Lovenox) syringe 40 mg (has no administration in time range)   aspirin chewable tablet 81 mg (has no administration in time range)   atorvastatin (Lipitor) tablet 80 mg (has no administration in time range)   docusate sodium (Colace) capsule 100 mg (has no administration in time range)   sodium chloride 0.9 % bolus 1,000 mL (0 mL intravenous Stopped 4/23/24 1805)   aspirin chewable tablet 243 mg (243 mg oral Given 4/23/24 1727)   morphine injection 4 mg (4 mg intravenous Given 4/23/24 1728)   ondansetron (Zofran) injection 4 mg (4 mg intravenous Given 4/23/24 1730)   LORazepam (Ativan) injection 1 mg (1 mg intravenous Given 4/23/24 1920)   diphenhydrAMINE (BENADryl) injection 50 mg (50 mg intravenous Given 4/23/24 1921)   iohexol (OMNIPaque) 350 mg iodine/mL solution 78 mL (78 mL intravenous Given 4/23/24 1943)     ED Course as of 04/23/24 2301   Tue Apr 23, 2024   1702 Rate 81 bpm, pr interval 180 ms qrs duration 82 ms, qt qtc 360/418 ms prt axes 24 91 39 normal rate rhythm does have right axis. Personally reviewed by me.  [PK]   1836 Discussed results with pt. Us and pe study pending. Discussed that we discussed case with cardiology who recommended admission.  [PK]   1910 Ct scan stated pt unable to tolerate ct scan without imaging [PK]      ED Course User Index  [PK] Bety Pruett, APRN-CNP         Diagnoses as of 04/23/24 2301   Chest pain, unspecified type   Dyspnea, unspecified type   Elevated d-dimer   Anginal equivalent (CMS-HCC)     Re-examination:    Patient’s condition stable.    Consult(s):   IP CONSULT TO CARDIOLOGY      MDM:       Alvaro Christensen is a 68 y.o. male who presents to the ED for evaluation of chest pain along with chest pain, sob with exertion while on work. He stated that he felt more sob as the day went on. He stated that did just have  stress test by Dr Walton 6 months ago. Thought he did okay. He thinks he is on HTN HLD meds but thinks he just had med change. Not really sure. He denied fever chills coughing. He did smoke but does not feel he has copd. No recent trips, travels and no pe history. He does have family history of heart disease.  Never had heart cath.    ED course received nss iv morphine total 324 mg aspirin. Us of legs negative. Patient initially could not tolerate ct pe and received iv ativan and benadryl with good results.  Tnl x 2 negative, lipase 40, mag 1.91, tsh 2.64.  D dimer 546. Wbc 8.0 HNH stable.  Pe study neg for pe does have possible fatty liver. EKG stable with no acute ischemic changes. Dr Page result cardioogist aware of the above and agreed to serve on consult.   Admitted to hospitalist.   Ddx: chest pain elevated d dimer anginal equivalent   Plan of Care/Counseling:  I reviewed today's visit with the patient  in addition to providing specific details for the plan of care and counseling regarding the diagnosis and prognosis.  Questions are answered at this time and are agreeable with the plan.    ASSESSMENT     1. Chest pain, unspecified type    2. Dyspnea, unspecified type    3. Elevated d-dimer    4. Anginal equivalent (CMS-[facility])      PLAN   Admitted to Floor tele the case was discussed with the admitting physician hospitalist   Condition improved  Patient condition is good    New Medications     New Medications Ordered This Visit   Medications    sodium chloride 0.9 % bolus 1,000 mL    aspirin chewable tablet 243 mg    morphine injection 4 mg    ondansetron (Zofran) injection 4 mg    LORazepam (Ativan) injection 1 mg     20 min prior to ct scan    diphenhydrAMINE (BENADryl) injection 50 mg     20 min prior to ct scan    iohexol (OMNIPaque) 350 mg iodine/mL solution 78 mL    oxygen (O2) therapy     Order Specific Question:   Device     Answer:   Nasal Cannula     Order Specific Question:   Rate in liters per  minute:     Answer:   2 LPM     Order Specific Question:   Keep O2 Sat Above:     Answer:   90%    enoxaparin (Lovenox) syringe 40 mg    aspirin chewable tablet 81 mg    atorvastatin (Lipitor) tablet 80 mg    docusate sodium (Colace) capsule 100 mg     Electronically signed by RIANA Villagomez     **This report was transcribed using voice recognition software. Every effort was made to ensure accuracy; however, inadvertent computerized transcription errors may be present.  END OF ED PROVIDER NOTE     RIANA Villagomez  04/23/24 9521

## 2024-04-24 VITALS
TEMPERATURE: 97.9 F | HEART RATE: 77 BPM | WEIGHT: 244 LBS | RESPIRATION RATE: 16 BRPM | SYSTOLIC BLOOD PRESSURE: 153 MMHG | DIASTOLIC BLOOD PRESSURE: 90 MMHG | HEIGHT: 71 IN | OXYGEN SATURATION: 95 % | BODY MASS INDEX: 34.16 KG/M2

## 2024-04-24 PROBLEM — R07.89 ATYPICAL CHEST PAIN: Status: ACTIVE | Noted: 2024-04-23

## 2024-04-24 PROBLEM — I20.89 ANGINAL EQUIVALENT (CMS-HCC): Status: ACTIVE | Noted: 2024-04-23

## 2024-04-24 PROBLEM — R07.89 ATYPICAL CHEST PAIN: Status: RESOLVED | Noted: 2024-04-23 | Resolved: 2024-04-24

## 2024-04-24 PROBLEM — I20.89 ANGINAL EQUIVALENT (CMS-HCC): Status: RESOLVED | Noted: 2024-04-23 | Resolved: 2024-04-24

## 2024-04-24 PROBLEM — R07.9 CHEST PAIN: Status: RESOLVED | Noted: 2024-04-23 | Resolved: 2024-04-24

## 2024-04-24 PROBLEM — R06.00 DYSPNEA: Status: ACTIVE | Noted: 2024-04-24

## 2024-04-24 LAB
CARDIAC TROPONIN I PNL SERPL HS: 6 NG/L (ref 0–20)
CARDIAC TROPONIN I PNL SERPL HS: 6 NG/L (ref 0–20)
CHOLEST SERPL-MCNC: 118 MG/DL (ref 0–199)
CHOLESTEROL/HDL RATIO: 3.5
HDLC SERPL-MCNC: 34.1 MG/DL
HOLD SPECIMEN: NORMAL
LDLC SERPL CALC-MCNC: 58 MG/DL
NON HDL CHOLESTEROL: 84 MG/DL (ref 0–149)
TRIGL SERPL-MCNC: 131 MG/DL (ref 0–149)
VLDL: 26 MG/DL (ref 0–40)

## 2024-04-24 PROCEDURE — 7100000010 HC PHASE TWO TIME - EACH INCREMENTAL 1 MINUTE: Performed by: INTERNAL MEDICINE

## 2024-04-24 PROCEDURE — C1760 CLOSURE DEV, VASC: HCPCS | Performed by: INTERNAL MEDICINE

## 2024-04-24 PROCEDURE — 36415 COLL VENOUS BLD VENIPUNCTURE: CPT | Performed by: NURSE PRACTITIONER

## 2024-04-24 PROCEDURE — 99152 MOD SED SAME PHYS/QHP 5/>YRS: CPT | Performed by: INTERNAL MEDICINE

## 2024-04-24 PROCEDURE — 96374 THER/PROPH/DIAG INJ IV PUSH: CPT | Mod: 59

## 2024-04-24 PROCEDURE — 2500000005 HC RX 250 GENERAL PHARMACY W/O HCPCS: Performed by: NURSE PRACTITIONER

## 2024-04-24 PROCEDURE — 96372 THER/PROPH/DIAG INJ SC/IM: CPT | Mod: 59 | Performed by: NURSE PRACTITIONER

## 2024-04-24 PROCEDURE — 2550000001 HC RX 255 CONTRASTS: Performed by: INTERNAL MEDICINE

## 2024-04-24 PROCEDURE — 96373 THER/PROPH/DIAG INJ IA: CPT | Mod: 59 | Performed by: INTERNAL MEDICINE

## 2024-04-24 PROCEDURE — 93458 L HRT ARTERY/VENTRICLE ANGIO: CPT | Performed by: INTERNAL MEDICINE

## 2024-04-24 PROCEDURE — 2500000004 HC RX 250 GENERAL PHARMACY W/ HCPCS (ALT 636 FOR OP/ED): Performed by: NURSE PRACTITIONER

## 2024-04-24 PROCEDURE — 2500000004 HC RX 250 GENERAL PHARMACY W/ HCPCS (ALT 636 FOR OP/ED): Performed by: INTERNAL MEDICINE

## 2024-04-24 PROCEDURE — G0378 HOSPITAL OBSERVATION PER HR: HCPCS

## 2024-04-24 PROCEDURE — 2500000005 HC RX 250 GENERAL PHARMACY W/O HCPCS: Performed by: INTERNAL MEDICINE

## 2024-04-24 PROCEDURE — 96376 TX/PRO/DX INJ SAME DRUG ADON: CPT | Mod: 59

## 2024-04-24 PROCEDURE — 84484 ASSAY OF TROPONIN QUANT: CPT | Performed by: NURSE PRACTITIONER

## 2024-04-24 PROCEDURE — 2720000007 HC OR 272 NO HCPCS: Performed by: INTERNAL MEDICINE

## 2024-04-24 PROCEDURE — G0269 OCCLUSIVE DEVICE IN VEIN ART: HCPCS | Mod: TC | Performed by: INTERNAL MEDICINE

## 2024-04-24 PROCEDURE — 99233 SBSQ HOSP IP/OBS HIGH 50: CPT | Performed by: PHYSICIAN ASSISTANT

## 2024-04-24 PROCEDURE — 2780000003 HC OR 278 NO HCPCS: Performed by: INTERNAL MEDICINE

## 2024-04-24 PROCEDURE — 2500000001 HC RX 250 WO HCPCS SELF ADMINISTERED DRUGS (ALT 637 FOR MEDICARE OP): Performed by: NURSE PRACTITIONER

## 2024-04-24 PROCEDURE — 83036 HEMOGLOBIN GLYCOSYLATED A1C: CPT | Mod: GEALAB | Performed by: NURSE PRACTITIONER

## 2024-04-24 PROCEDURE — 80061 LIPID PANEL: CPT | Performed by: NURSE PRACTITIONER

## 2024-04-24 PROCEDURE — 7100000009 HC PHASE TWO TIME - INITIAL BASE CHARGE: Performed by: INTERNAL MEDICINE

## 2024-04-24 PROCEDURE — C1894 INTRO/SHEATH, NON-LASER: HCPCS | Performed by: INTERNAL MEDICINE

## 2024-04-24 RX ORDER — MELOXICAM 7.5 MG/1
15 TABLET ORAL DAILY
Status: DISCONTINUED | OUTPATIENT
Start: 2024-04-24 | End: 2024-04-24 | Stop reason: HOSPADM

## 2024-04-24 RX ORDER — AMLODIPINE BESYLATE 5 MG/1
5 TABLET ORAL DAILY
Status: DISCONTINUED | OUTPATIENT
Start: 2024-04-24 | End: 2024-04-24 | Stop reason: HOSPADM

## 2024-04-24 RX ORDER — MIDAZOLAM HYDROCHLORIDE 1 MG/ML
INJECTION, SOLUTION INTRAMUSCULAR; INTRAVENOUS AS NEEDED
Status: DISCONTINUED | OUTPATIENT
Start: 2024-04-24 | End: 2024-04-24 | Stop reason: HOSPADM

## 2024-04-24 RX ORDER — METOPROLOL SUCCINATE 25 MG/1
25 TABLET, EXTENDED RELEASE ORAL DAILY
Status: DISCONTINUED | OUTPATIENT
Start: 2024-04-24 | End: 2024-04-24 | Stop reason: HOSPADM

## 2024-04-24 RX ORDER — SODIUM CHLORIDE 9 MG/ML
INJECTION, SOLUTION INTRAVENOUS CONTINUOUS PRN
Status: COMPLETED | OUTPATIENT
Start: 2024-04-24 | End: 2024-04-24

## 2024-04-24 RX ORDER — LIDOCAINE HYDROCHLORIDE 20 MG/ML
INJECTION, SOLUTION INFILTRATION; PERINEURAL AS NEEDED
Status: DISCONTINUED | OUTPATIENT
Start: 2024-04-24 | End: 2024-04-24 | Stop reason: HOSPADM

## 2024-04-24 RX ORDER — PANTOPRAZOLE SODIUM 40 MG/1
40 TABLET, DELAYED RELEASE ORAL
Status: DISCONTINUED | OUTPATIENT
Start: 2024-04-24 | End: 2024-04-24 | Stop reason: HOSPADM

## 2024-04-24 RX ORDER — ACETAMINOPHEN 160 MG/5ML
650 SOLUTION ORAL EVERY 4 HOURS PRN
Status: DISCONTINUED | OUTPATIENT
Start: 2024-04-24 | End: 2024-04-24 | Stop reason: HOSPADM

## 2024-04-24 RX ORDER — FENTANYL CITRATE 50 UG/ML
INJECTION, SOLUTION INTRAMUSCULAR; INTRAVENOUS AS NEEDED
Status: DISCONTINUED | OUTPATIENT
Start: 2024-04-24 | End: 2024-04-24 | Stop reason: HOSPADM

## 2024-04-24 RX ORDER — LISINOPRIL 10 MG/1
10 TABLET ORAL DAILY
COMMUNITY

## 2024-04-24 RX ORDER — ACETAMINOPHEN 650 MG/1
650 SUPPOSITORY RECTAL EVERY 4 HOURS PRN
Status: DISCONTINUED | OUTPATIENT
Start: 2024-04-24 | End: 2024-04-24 | Stop reason: HOSPADM

## 2024-04-24 RX ORDER — ACETAMINOPHEN 325 MG/1
650 TABLET ORAL EVERY 4 HOURS PRN
Status: DISCONTINUED | OUTPATIENT
Start: 2024-04-24 | End: 2024-04-24 | Stop reason: HOSPADM

## 2024-04-24 RX ORDER — MORPHINE SULFATE 2 MG/ML
1 INJECTION, SOLUTION INTRAMUSCULAR; INTRAVENOUS ONCE
Status: COMPLETED | OUTPATIENT
Start: 2024-04-24 | End: 2024-04-24

## 2024-04-24 RX ORDER — LISINOPRIL 10 MG/1
10 TABLET ORAL DAILY
Status: DISCONTINUED | OUTPATIENT
Start: 2024-04-24 | End: 2024-04-24 | Stop reason: HOSPADM

## 2024-04-24 RX ADMIN — DOCUSATE SODIUM 100 MG: 100 CAPSULE, LIQUID FILLED ORAL at 00:51

## 2024-04-24 RX ADMIN — Medication 2 L/MIN: at 08:00

## 2024-04-24 RX ADMIN — PANTOPRAZOLE SODIUM 40 MG: 40 TABLET, DELAYED RELEASE ORAL at 11:43

## 2024-04-24 RX ADMIN — NITROGLYCERIN 0.5 INCH: 20 OINTMENT TOPICAL at 06:54

## 2024-04-24 RX ADMIN — ASPIRIN 81 MG CHEWABLE TABLET 81 MG: 81 TABLET CHEWABLE at 09:00

## 2024-04-24 RX ADMIN — ENOXAPARIN SODIUM 40 MG: 40 INJECTION SUBCUTANEOUS at 00:51

## 2024-04-24 RX ADMIN — ATORVASTATIN CALCIUM 80 MG: 80 TABLET, FILM COATED ORAL at 00:51

## 2024-04-24 RX ADMIN — MORPHINE SULFATE 1 MG: 2 INJECTION, SOLUTION INTRAMUSCULAR; INTRAVENOUS at 00:51

## 2024-04-24 SDOH — SOCIAL STABILITY: SOCIAL INSECURITY: WERE YOU ABLE TO COMPLETE ALL THE BEHAVIORAL HEALTH SCREENINGS?: YES

## 2024-04-24 SDOH — SOCIAL STABILITY: SOCIAL INSECURITY: HAVE YOU HAD THOUGHTS OF HARMING ANYONE ELSE?: NO

## 2024-04-24 ASSESSMENT — PAIN - FUNCTIONAL ASSESSMENT
PAIN_FUNCTIONAL_ASSESSMENT: 0-10

## 2024-04-24 ASSESSMENT — ENCOUNTER SYMPTOMS
EYE REDNESS: 0
CHEST TIGHTNESS: 0
MYALGIAS: 1
ABDOMINAL DISTENTION: 0
CHILLS: 0
DIZZINESS: 0
FEVER: 0
CONSTIPATION: 0
COUGH: 0
BRUISES/BLEEDS EASILY: 0
ARTHRALGIAS: 1
ABDOMINAL PAIN: 0
WHEEZING: 0
SORE THROAT: 0
PALPITATIONS: 0
HEADACHES: 0
STRIDOR: 0
BACK PAIN: 1
DIARRHEA: 0
SHORTNESS OF BREATH: 1
EYE ITCHING: 0
CONFUSION: 0
VOMITING: 0
WEAKNESS: 0
NAUSEA: 0
AGITATION: 0
POLYDIPSIA: 0
DYSURIA: 0

## 2024-04-24 ASSESSMENT — COGNITIVE AND FUNCTIONAL STATUS - GENERAL
DAILY ACTIVITIY SCORE: 24
PATIENT BASELINE BEDBOUND: NO
MOBILITY SCORE: 24

## 2024-04-24 ASSESSMENT — PAIN DESCRIPTION - DESCRIPTORS: DESCRIPTORS: SHARP

## 2024-04-24 ASSESSMENT — PAIN SCALES - GENERAL
PAINLEVEL_OUTOF10: 6
PAINLEVEL_OUTOF10: 7
PAINLEVEL_OUTOF10: 0 - NO PAIN
PAINLEVEL_OUTOF10: 5 - MODERATE PAIN
PAINLEVEL_OUTOF10: 0 - NO PAIN

## 2024-04-24 ASSESSMENT — ACTIVITIES OF DAILY LIVING (ADL)
BATHING: INDEPENDENT
DRESSING YOURSELF: INDEPENDENT
PATIENT'S MEMORY ADEQUATE TO SAFELY COMPLETE DAILY ACTIVITIES?: YES
TOILETING: INDEPENDENT
LACK_OF_TRANSPORTATION: NO
ADEQUATE_TO_COMPLETE_ADL: YES
JUDGMENT_ADEQUATE_SAFELY_COMPLETE_DAILY_ACTIVITIES: YES
HEARING - LEFT EAR: FUNCTIONAL
WALKS IN HOME: INDEPENDENT
FEEDING YOURSELF: INDEPENDENT
GROOMING: INDEPENDENT
HEARING - RIGHT EAR: FUNCTIONAL

## 2024-04-24 ASSESSMENT — PAIN DESCRIPTION - ORIENTATION: ORIENTATION: LEFT

## 2024-04-24 ASSESSMENT — PATIENT HEALTH QUESTIONNAIRE - PHQ9
SUM OF ALL RESPONSES TO PHQ9 QUESTIONS 1 & 2: 0
1. LITTLE INTEREST OR PLEASURE IN DOING THINGS: NOT AT ALL
2. FEELING DOWN, DEPRESSED OR HOPELESS: NOT AT ALL

## 2024-04-24 ASSESSMENT — LIFESTYLE VARIABLES
HOW OFTEN DO YOU HAVE A DRINK CONTAINING ALCOHOL: NEVER
AUDIT-C TOTAL SCORE: 0
SKIP TO QUESTIONS 9-10: 1
HOW OFTEN DO YOU HAVE 6 OR MORE DRINKS ON ONE OCCASION: NEVER
AUDIT-C TOTAL SCORE: 0
HOW MANY STANDARD DRINKS CONTAINING ALCOHOL DO YOU HAVE ON A TYPICAL DAY: PATIENT DOES NOT DRINK

## 2024-04-24 ASSESSMENT — PAIN DESCRIPTION - LOCATION: LOCATION: CHEST

## 2024-04-24 NOTE — DISCHARGE SUMMARY
Discharge Diagnosis  Atypical chest pain, likely MSK chest pain    Issues Requiring Follow-Up  See pulm for PFTs  Recommend sleep study  PCP and cardiology follow up    Discharge Meds     Your medication list        CONTINUE taking these medications        Instructions Last Dose Given Next Dose Due   aspirin 81 mg EC tablet           diclofenac sodium 1 % gel  Commonly known as: Voltaren           lansoprazole 15 mg DR capsule  Commonly known as: Prevacid           lisinopril 10 mg tablet           meloxicam 15 mg tablet  Commonly known as: Mobic           oxyCODONE 10 mg immediate release tablet  Commonly known as: Roxicodone                  STOP taking these medications      amLODIPine 10 mg tablet  Commonly known as: Norvasc                 Test Results Pending At Discharge  Pending Labs       No current pending labs.            Hospital Course   From [facility]:  Alvaro Christensen is a 68 y.o. male with pertinent medical history of osteoarthritis, chronic pain, hypertension, obesity who presented to [facility] ER with worsening chest pain and dyspnea with exertion that he noted yesterday while shoveling rocks.  He described the chest pain as nonreproducible left chest wall pressure that was initially severe and now is moderate and dull, but constant after morphine.  He said the chest pain was significant enough that he could not catch his breath.  He complained of associated left neck pain & left arm pain.  He denied associated paresthesias in his left arm, cough, nausea or vomiting.  He also complained of left calf pain and bilateral leg swelling for the past few weeks that has improved.  He says he has had chest pain and shortness of breath with exertion for the past couple of years, but yesterday was worse.   He also had a stress test about 6 months ago with Dr. Page. He believes he has had an echo within the past 12 months.  He did receive 4 baby aspirin in total yesterday. ER said Camilo wanted admitted for  further workup.   PE study and bilateral lower extremity DVT study were negative in the ER. Troponin 4, 5, 6, EKG reviewed and unremarkable, D-dimer elevated at 546, BMP and CBC are unremarkable.    Dr Page took for left heart cath which was clear and he was cleared for dc by cardiology.  Recommend stop norvasc, follow up with pulm for LFTs as he is a former smoker.  On day of discharge, patient denied CP, SOB, n/v/diarrhea/constipation, was tolerating diet and ambulating without issue.  Patient appeared hemodynamically stable at time of discharge. Discussed with attending physician who agreed with discharge plan.      Pertinent Physical Exam At Time of Discharge  Physical Exam  Physical Exam  Gen: NAD, obese  Eyes:  EOM intact  ENT: MMM  Neck: No JVD  Respiratory: CTAB, no wheezes/rhonchi  Cardiac: RRR, no murmurs rubs or gallops  Abdomen: soft, NT, +BS  Extremities: no edema or cyanosis  Neuro: No focal deficits, alert and oriented x 3  Psych:  appropriate mood and behavior    Outpatient Follow-Up  No future appointments.  PCP and cardiology follow up  Referral to pulmando Wilkerson PA-C

## 2024-04-24 NOTE — PROGRESS NOTES
Alvaro Christensen is a 68 y.o. male on day 0 of admission presenting with Chest pain.      Subjective   Denies CP or SOB overnight, slept well.  NPO for heart cath this morning       Objective     Last Recorded Vitals  /84 (BP Location: Left arm, Patient Position: Lying)   Pulse 68   Temp 36.7 °C (98.1 °F) (Temporal)   Resp 18   Wt 111 kg (244 lb)   SpO2 95%   Intake/Output last 3 Shifts:  No intake or output data in the 24 hours ending 04/24/24 0835    Admission Weight  Weight: 107 kg (235 lb) (04/23/24 1704)    Daily Weight  04/23/24 : 111 kg (244 lb)    Image Results  CT angio chest for pulmonary embolism  Narrative: Interpreted By:  Claudia Roca,   STUDY:  CT ANGIO CHEST FOR PULMONARY EMBOLISM;  4/23/2024 7:41 pm      INDICATION:  Signs/Symptoms:chest pain sob.      COMPARISON:  None      ACCESSION NUMBER(S):  [id number]      ORDERING CLINICIAN:  JIHAN VEGA      TECHNIQUE:  Helical data acquisition of the chest was obtained after intravenous  administration of 78 mL Omnipaque 350, as per PE protocol. Images  were reformatted in coronal and sagittal planes. Axial and coronal  maximum intensity projection (MIP) images were created and reviewed.      FINDINGS:  POTENTIAL LIMITATIONS OF THE STUDY: None      HEART AND VESSELS:      There are no discrete filling defects within main pulmonary artery  and its branches to suggest acute pulmonary embolism.      Main pulmonary artery and its branches are normal in caliber.      The thoracic aorta normal in course and caliber.      No coronary artery calcifications are seen. Please note, the study is  not optimized for evaluation of coronary arteries.      The cardiac chambers are not enlarged.      There is no pericardial effusion seen.      MEDIASTINUM AND RADHA, LOWER NECK AND AXILLA:      The visualized thyroid gland is within normal limits.      No evidence of thoracic lymphadenopathy by CT criteria.      Esophagus appears within normal limits as  seen.      LUNGS AND AIRWAYS:  The trachea and central airways are patent. No endobronchial lesion  is seen.      The bilateral lungs are clear without evidence of focal  consolidation, pleural effusion, or pneumothorax.          UPPER ABDOMEN:  Limited evaluation of visualized upper abdominal structures  demonstrates enlarged liver with coarse appearance and decreased  attenuation suggestive of hepatic steatosis and hepatomegaly. No  focal masses within visualized liver accounting for the limitation of  this exam.      CHEST WALL AND OSSEOUS STRUCTURES:      Chest wall is within normal limits.  No acute osseous pathology.There are no suspicious osseous lesions.      Impression: 1. No evidence of acute pulmonary embolism.          MACRO:  None      Signed by: Claudia Roca 4/23/2024 8:29 PM  Dictation workstation:   [id number]  Vascular US lower extremity venous duplex bilateral  Narrative: Interpreted By:  Claudia Roca,   STUDY:  [facility] US LOWER EXTREMITY VENOUS DUPLEX BILATERAL 4/23/2024 7:00 pm      INDICATION:  69 y/o  M with Signs/Symptoms:leg pain swelling. LMP:  Unknown.      COMPARISON:  None.      ACCESSION NUMBER(S):  [id number]      ORDERING CLINICIAN:  JIHAN VEGA      TECHNIQUE:  Routine ultrasound of the  bilateral lower extremities was performed  with duplex Doppler (color and spectral) evaluation.   Static images  were obtained for remote interpretation.      FINDINGS:  THIGH VEINS:  The common femoral, femoral, popliteal, proximal medial  saphenous, and deep femoral veins are patent and free of thrombus  bilaterally.  The veins are normally compressible.  They demonstrate  normal phasic flow and augmentation response bilaterally.      CALF VEINS:  The visualized peroneal and posterior tibial calf veins  are patent bilaterally.      Impression: Negative study.  No sonographic evidence for deep venous thrombosis  identified within the visualized portions of the deep venous system  from the  inguinal fossa to bilateral calf veins bilaterally.      Signed by: Claudia Roca 4/23/2024 7:19 PM  Dictation workstation:   [id number]      Physical Exam  Physical Exam  Gen: NAD, obese  Eyes:  EOM intact  ENT: MMM  Neck: No JVD  Respiratory: CTAB, no wheezes/rhonchi  Cardiac: RRR, no murmurs rubs or gallops  Abdomen: soft, NT, +BS  Extremities: 1+ BLE edema  Neuro: No focal deficits, alert and oriented x 3  Psych:  appropriate mood and behavior      Assessment/Plan      Principal Problem:    Chest pain  Active Problems:    Anginal equivalent (CMS-[facility])  -OBS tele  -NPO  -cardiology consult  -lipid panel WNL  -left heart cath this morning  -ASA/statin    HTN  -lisinopril  -norvasc  -metop    Arthritis  -Tylenol PRN    DVT prophy  -lovenox    Dispo:  dc plan pending heart cath results  D/w [facility]

## 2024-04-24 NOTE — POST-PROCEDURE NOTE
Physician Transition of Care Summary  Invasive Cardiovascular Lab    Procedure Date: 4/24/2024  Attending:    Jonas Treviño - Primary  Resident/Fellow/Other Assistant: Surgeons and Role:  * No surgeons found with a matching role *    Indications:   Pre-op Diagnosis     * Chest pain, unspecified type [R07.9]     * Anginal equivalent (CMS-HCC) [I20.89]    Post-procedure diagnosis:   Post-op Diagnosis     * Chest pain, unspecified type [R07.9]     * Anginal equivalent (CMS-HCC) [I20.89]    Procedure(s):   Left Heart Cath, No LV  79566 - [place] L HRT CATH W/NJX L VENTRICULOGRAPHY IMG S&I    Left Heart Cath, No LV  69109 - [place] L HRT CATH W/NJX L VENTRICULOGRAPHY IMG S&I        Procedure Findings:   Normal coronaries no significant CAD    Description of the Procedure:   [facility]    Complications:   NONE    Stents/Implants:   Implants       No implant documentation for this case.            Anticoagulation/Antiplatelet Plan:   ASA    Estimated Blood Loss:   5 mL    Anesthesia: Moderate Sedation Anesthesia Staff: No anesthesia staff entered.    Any Specimen(s) Removed:   No specimens collected during this procedure.    Disposition:   FLOOR       Electronically signed by: Camilo Treviño MD, 4/24/2024 9:27 AM

## 2024-04-24 NOTE — CONSULTS
Inpatient consult to Cardiology  Consult performed by: JOE Foreman-CNP  Consult ordered by: JOE David-CNP  Reason for consult: chest pain          History Of Present Illness  Alvaro Christensen is a 68 y.o. male presenting with complaints of chest pain and shortness of breath.  He had a stress test in October 2023 which was negative for ischemia and a normal echocardiogram in October 2023 as well.  He was sent for a coronary artery CT angio which was inconclusive due to IV infiltrate and he was going to be scheduled for an outpatient heart cath.  His chest pain or shortness of breath progressively got worse so he proceeded to the ER.    Lab work showed glucose 94, sodium 140, potassium 4.2, BUN/creatinine 12/1.1, lactate 0.9, BNP 27, troponin negative, TSH 2.64, INR 1.1, WBCs 8.0, H&H 14.2/42.9, ultrasound bilateral lower extremities negative for DVT, CTA of the chest negative for PE and acute cardiopulmonary process.    EKG showed sinus rhythm, no acute ischemic changes.      Past Medical History  Past Medical History:   Diagnosis Date    Hypertension        Surgical History  Past Surgical History:   Procedure Laterality Date    OTHER SURGICAL HISTORY  11/02/2020    Back surgery    OTHER SURGICAL HISTORY  11/02/2020    Hip surgery        Social History  He reports that he quit smoking about 10 years ago. His smoking use included cigarettes. He has been exposed to tobacco smoke. He has never used smokeless tobacco. He reports that he does not currently use alcohol. He reports that he does not use drugs.    Family History  No family history on file.     Allergies  Penicillins and Sulfa (sulfonamide antibiotics)    Review of Systems  Review of Systems   Constitutional:  Negative for chills and fever.   Respiratory:  Positive for shortness of breath.    Cardiovascular:  Positive for chest pain and leg swelling. Negative for palpitations.   Gastrointestinal:  Negative for abdominal pain.  "  Neurological:  Negative for dizziness and weakness.          Physical Exam  Constitutional: Well developed, awake/alert/oriented x3, no distress, alert and cooperative  Eyes: PERRL, EOMI, clear sclera  ENMT: mucous membranes moist, no apparent injury, no lesions seen  Head/Neck: Neck supple, no apparent injury, thyroid without mass or tenderness, No JVD, trachea midline, no bruits  Respiratory/Thorax: Patent airways, CTAB, normal breath sounds with good chest expansion, thorax symmetric  Cardiovascular: Regular, rate and rhythm, no murmurs, 2+ equal pulses of the extremities, normal S 1and S 2  Gastrointestinal: Nondistended, soft, non-tender, no rebound tenderness or guarding, no masses palpable, no organomegaly, +BS, no bruits  Musculoskeletal: ROM intact, no joint swelling, normal strength  Extremities: normal extremities, no cyanosis edema, contusions or wounds, no clubbing  Neurological: alert and oriented x3, intact senses, motor, response and reflexes, normal strength  Lymphatic: No significant lymphadenopathy  Psychological: Appropriate mood and behavior  Skin: Warm and dry, cath site to right groin intact.        Last Recorded Vitals  Blood pressure 151/85, pulse 54, temperature 36.6 °C (97.9 °F), temperature source Temporal, resp. rate 16, height 1.803 m (5' 11\"), weight 111 kg (244 lb), SpO2 93%.    Relevant Results    Scheduled medications  amLODIPine, 5 mg, oral, Daily  aspirin, 81 mg, oral, Daily  atorvastatin, 80 mg, oral, Nightly  docusate sodium, 100 mg, oral, BID  enoxaparin, 40 mg, subcutaneous, q24h  lisinopril, 10 mg, oral, Daily  [Held by provider] meloxicam, 15 mg, oral, Daily  metoprolol succinate XL, 25 mg, oral, Daily  nitroglycerin, 0.5 inch, transdermal, Once  oxygen, , inhalation, Continuous - Inhalation  pantoprazole, 40 mg, oral, Daily before breakfast      Continuous medications     PRN medications  PRN medications: acetaminophen **OR** acetaminophen **OR** acetaminophen, " nitroglycerin    Results for orders placed or performed during the hospital encounter of 04/23/24 (from the past 24 hour(s))   CBC and Auto Differential   Result Value Ref Range    WBC 8.0 4.4 - 11.3 x10*3/uL    nRBC 0.0 0.0 - 0.0 /100 WBCs    RBC 4.83 4.50 - 5.90 x10*6/uL    Hemoglobin 14.2 13.5 - 17.5 g/dL    Hematocrit 42.9 41.0 - 52.0 %    MCV 89 80 - 100 fL    MCH 29.4 26.0 - 34.0 pg    MCHC 33.1 32.0 - 36.0 g/dL    RDW 12.6 11.5 - 14.5 %    Platelets 257 150 - 450 x10*3/uL    Neutrophils % 50.6 40.0 - 80.0 %    Immature Granulocytes %, Automated 0.1 0.0 - 0.9 %    Lymphocytes % 34.2 13.0 - 44.0 %    Monocytes % 10.0 2.0 - 10.0 %    Eosinophils % 4.1 0.0 - 6.0 %    Basophils % 1.0 0.0 - 2.0 %    Neutrophils Absolute 4.07 1.20 - 7.70 x10*3/uL    Immature Granulocytes Absolute, Automated 0.01 0.00 - 0.70 x10*3/uL    Lymphocytes Absolute 2.75 1.20 - 4.80 x10*3/uL    Monocytes Absolute 0.80 0.10 - 1.00 x10*3/uL    Eosinophils Absolute 0.33 0.00 - 0.70 x10*3/uL    Basophils Absolute 0.08 0.00 - 0.10 x10*3/uL   Comprehensive metabolic panel   Result Value Ref Range    Glucose 94 74 - 99 mg/dL    Sodium 140 136 - 145 mmol/L    Potassium 4.2 3.5 - 5.3 mmol/L    Chloride 101 98 - 107 mmol/L    Bicarbonate 26 21 - 32 mmol/L    Anion Gap 17 10 - 20 mmol/L    Urea Nitrogen 12 6 - 23 mg/dL    Creatinine 1.10 0.50 - 1.30 mg/dL    eGFR 74 >60 mL/min/1.73m*2    Calcium 8.9 8.6 - 10.3 mg/dL    Albumin 4.6 3.4 - 5.0 g/dL    Alkaline Phosphatase 76 33 - 136 U/L    Total Protein 6.9 6.4 - 8.2 g/dL    AST 23 9 - 39 U/L    Bilirubin, Total 0.5 0.0 - 1.2 mg/dL    ALT 29 10 - 52 U/L   Magnesium   Result Value Ref Range    Magnesium 1.91 1.60 - 2.40 mg/dL   Lactate   Result Value Ref Range    Lactate 0.9 0.4 - 2.0 mmol/L   Lipase   Result Value Ref Range    Lipase 40 9 - 82 U/L   Protime-INR   Result Value Ref Range    Protime 12.2 9.8 - 12.8 seconds    INR 1.1 0.9 - 1.1   aPTT   Result Value Ref Range    aPTT 27 27 - 38 seconds    B-Type Natriuretic Peptide   Result Value Ref Range    BNP 27 0 - 99 pg/mL   D-Dimer, VTE Exclusion   Result Value Ref Range    D-Dimer, Quantitative VTE Exclusion 546 (H) <=500 ng/mL FEU   TSH with reflex to Free T4 if abnormal   Result Value Ref Range    Thyroid Stimulating Hormone 2.64 0.44 - 3.98 mIU/L   Troponin I, High Sensitivity, Initial   Result Value Ref Range    Troponin I, High Sensitivity 4 0 - 20 ng/L   Troponin, High Sensitivity, 1 Hour   Result Value Ref Range    Troponin I, High Sensitivity 5 0 - 20 ng/L   Lower extremity venous duplex left   Result Value Ref Range    BSA 2.31 m2   Troponin I, High Sensitivity   Result Value Ref Range    Troponin I, High Sensitivity 6 0 - 20 ng/L   Lipid Panel   Result Value Ref Range    Cholesterol 118 0 - 199 mg/dL    HDL-Cholesterol 34.1 mg/dL    Cholesterol/HDL Ratio 3.5     LDL Calculated 58 <=99 mg/dL    VLDL 26 0 - 40 mg/dL    Triglycerides 131 0 - 149 mg/dL    Non HDL Cholesterol 84 0 - 149 mg/dL   Troponin I, High Sensitivity   Result Value Ref Range    Troponin I, High Sensitivity 6 0 - 20 ng/L   Lavender Top   Result Value Ref Range    Extra Tube Hold for add-ons.               Assessment/Plan   Echocardiogram October 2023 reviewed, LVEF 65%, mild AR, abnormal LV relaxation    Stress test October 2023 negative for ischemia    Chest pain, SOB  -Troponin negative  -I reviewed the EKG, no acute changes, ST elevation or depression  -CTA negative  -Echo and stress test as above  -CTA of the coronary arteries inconclusive due to IV infiltrate  -s/p LHC today showed normal coronary arteries  -Suggest outpt PFTs    2. Leg swelling  -US BLE negative for DVT  -Most likely from Norvasc  -Can check PVR as outpt for venous insufficiency    3. Hypertension  -stable, slightly elevated  -2gm na diet  -cont meds  -monitor  '    Magdalena Richards, APRN-CNP

## 2024-04-24 NOTE — PROGRESS NOTES
04/24/24 1039   Discharge Planning   Living Arrangements Spouse/significant other   Support Systems Spouse/significant other   Assistance Needed A&Ox3, independent with ADLs, no DME, drives, room air at baseline   Type of Residence Private residence   Home or Post Acute Services None   Patient expects to be discharged to: Home no needs   Does the patient need discharge transport arranged? No     04/24/2024 1039: Spoke to the patient at the bedside. Patient prefers to return home when medically ready. Denies any discharge needs.

## 2024-04-24 NOTE — H&P
History Of Present Illness  Alvaro Christensen is a 68 y.o. male with pertinent medical history of osteoarthritis, chronic pain, hypertension, obesity who presented to [facility] ER with worsening chest pain and dyspnea with exertion that he noted yesterday while shoveling rocks.  He described the chest pain as nonreproducible left chest wall pressure that was initially severe and now is moderate and dull, but constant after morphine.  He said the chest pain was significant enough that he could not catch his breath.  He complained of associated left neck pain & left arm pain.  He denied associated paresthesias in his left arm, cough, nausea or vomiting.  He also complained of left calf pain and bilateral leg swelling for the past few weeks that has improved.  He says he has had chest pain and shortness of breath with exertion for the past couple of years, but yesterday was worse.   He also had a stress test about 6 months ago with Dr. Page. He believes he has had an echo within the past 12 months.  He did receive 4 baby aspirin in total yesterday. ER said Camilo wanted admitted for further workup.   PE study and bilateral lower extremity DVT study were negative in the ER. Troponin 4, 5, 6, EKG reviewed and unremarkable, D-dimer elevated at 546, BMP and CBC are unremarkable.     Past Medical History  He has a past medical history of Hypertension, chronic back pain, osteoarthritis, DJD, obesity.    Surgical History  Back surgery, hip surgery.     Social History  He reports that he quit smoking about 10 years ago. His smoking use included cigarettes. He has been exposed to tobacco smoke. He has never used smokeless tobacco. He reports that he does not currently use alcohol. He reports that he does not use drugs.      Family History  He denied family history of heart disease, hypertension or diabetes.     Allergies  Penicillins, and Sulfa (sulfonamide antibiotics)    Review of Systems   Constitutional:  Negative  for chills and fever.   HENT:  Negative for sneezing and sore throat.    Eyes:  Negative for redness and itching.   Respiratory:  Positive for shortness of breath. Negative for cough, chest tightness, wheezing and stridor.    Cardiovascular:  Positive for chest pain and leg swelling. Negative for palpitations.        With associated left arm pain and left neck pain   Gastrointestinal:  Negative for abdominal distention, constipation, diarrhea, nausea and vomiting.   Endocrine: Negative for polydipsia and polyuria.   Genitourinary:  Negative for dysuria and urgency.        +nocturia   Musculoskeletal:  Positive for arthralgias, back pain and myalgias.        +chronic   Skin:  Negative for pallor and rash.   Neurological:  Negative for dizziness and headaches.   Hematological:  Does not bruise/bleed easily.   Psychiatric/Behavioral:  Negative for agitation, behavioral problems and confusion.        Physical Exam  Constitutional:       General: He is not in acute distress.     Appearance: He is obese. He is not ill-appearing, toxic-appearing or diaphoretic.   HENT:      Head: Normocephalic and atraumatic.      Mouth/Throat:      Mouth: Mucous membranes are dry.      Pharynx: Oropharynx is clear.   Eyes:      Extraocular Movements: Extraocular movements intact.      Conjunctiva/sclera: Conjunctivae normal.   Neck:      Vascular: No carotid bruit.   Cardiovascular:      Rate and Rhythm: Normal rate and regular rhythm.      Pulses: Normal pulses.      Heart sounds: No murmur heard.     Comments: NO JVD  Pulmonary:      Effort: Pulmonary effort is normal. No respiratory distress.      Breath sounds: Normal breath sounds. No wheezing, rhonchi or rales.      Comments: No use of accessory muscles  Abdominal:      General: Bowel sounds are normal. There is no distension.      Palpations: Abdomen is soft.      Tenderness: There is no abdominal tenderness. There is no guarding or rebound.   Musculoskeletal:         General:  Normal range of motion.      Cervical back: Normal range of motion.      Right lower leg: Edema present.      Left lower leg: Edema present.      Comments: + 1BLE   Skin:     General: Skin is warm and dry.      Coloration: Skin is not jaundiced.      Findings: No erythema or rash.   Neurological:      General: No focal deficit present.      Mental Status: He is alert and oriented to person, place, and time. Mental status is at baseline.   Psychiatric:         Mood and Affect: Mood normal.         Behavior: Behavior normal.       Last Recorded Vitals  /77   Pulse 66   Temp 36.6 °C (97.9 °F) (Temporal)   Resp 15   Wt 107 kg (235 lb)   SpO2 95%     Relevant Results  Scheduled medications  aspirin, 81 mg, oral, Daily  atorvastatin, 80 mg, oral, Nightly  docusate sodium, 100 mg, oral, BID  enoxaparin, 40 mg, subcutaneous, q24h  meloxicam, 15 mg, oral, Daily  oxygen, , inhalation, Continuous - Inhalation  pantoprazole, 40 mg, oral, Daily before breakfast      Continuous medications     PRN medications    Results for orders placed or performed during the hospital encounter of 04/23/24 (from the past 24 hour(s))   CBC and Auto Differential   Result Value Ref Range    WBC 8.0 4.4 - 11.3 x10*3/uL    nRBC 0.0 0.0 - 0.0 /100 WBCs    RBC 4.83 4.50 - 5.90 x10*6/uL    Hemoglobin 14.2 13.5 - 17.5 g/dL    Hematocrit 42.9 41.0 - 52.0 %    MCV 89 80 - 100 fL    MCH 29.4 26.0 - 34.0 pg    MCHC 33.1 32.0 - 36.0 g/dL    RDW 12.6 11.5 - 14.5 %    Platelets 257 150 - 450 x10*3/uL    Neutrophils % 50.6 40.0 - 80.0 %    Immature Granulocytes %, Automated 0.1 0.0 - 0.9 %    Lymphocytes % 34.2 13.0 - 44.0 %    Monocytes % 10.0 2.0 - 10.0 %    Eosinophils % 4.1 0.0 - 6.0 %    Basophils % 1.0 0.0 - 2.0 %    Neutrophils Absolute 4.07 1.20 - 7.70 x10*3/uL    Immature Granulocytes Absolute, Automated 0.01 0.00 - 0.70 x10*3/uL    Lymphocytes Absolute 2.75 1.20 - 4.80 x10*3/uL    Monocytes Absolute 0.80 0.10 - 1.00 x10*3/uL    Eosinophils  Absolute 0.33 0.00 - 0.70 x10*3/uL    Basophils Absolute 0.08 0.00 - 0.10 x10*3/uL   Comprehensive metabolic panel   Result Value Ref Range    Glucose 94 74 - 99 mg/dL    Sodium 140 136 - 145 mmol/L    Potassium 4.2 3.5 - 5.3 mmol/L    Chloride 101 98 - 107 mmol/L    Bicarbonate 26 21 - 32 mmol/L    Anion Gap 17 10 - 20 mmol/L    Urea Nitrogen 12 6 - 23 mg/dL    Creatinine 1.10 0.50 - 1.30 mg/dL    eGFR 74 >60 mL/min/1.73m*2    Calcium 8.9 8.6 - 10.3 mg/dL    Albumin 4.6 3.4 - 5.0 g/dL    Alkaline Phosphatase 76 33 - 136 U/L    Total Protein 6.9 6.4 - 8.2 g/dL    AST 23 9 - 39 U/L    Bilirubin, Total 0.5 0.0 - 1.2 mg/dL    ALT 29 10 - 52 U/L   Magnesium   Result Value Ref Range    Magnesium 1.91 1.60 - 2.40 mg/dL   Lactate   Result Value Ref Range    Lactate 0.9 0.4 - 2.0 mmol/L   Lipase   Result Value Ref Range    Lipase 40 9 - 82 U/L   Protime-INR   Result Value Ref Range    Protime 12.2 9.8 - 12.8 seconds    INR 1.1 0.9 - 1.1   aPTT   Result Value Ref Range    aPTT 27 27 - 38 seconds   B-Type Natriuretic Peptide   Result Value Ref Range    BNP 27 0 - 99 pg/mL   D-Dimer, VTE Exclusion   Result Value Ref Range    D-Dimer, Quantitative VTE Exclusion 546 (H) <=500 ng/mL FEU   TSH with reflex to Free T4 if abnormal   Result Value Ref Range    Thyroid Stimulating Hormone 2.64 0.44 - 3.98 mIU/L   Troponin I, High Sensitivity, Initial   Result Value Ref Range    Troponin I, High Sensitivity 4 0 - 20 ng/L   Troponin, High Sensitivity, 1 Hour   Result Value Ref Range    Troponin I, High Sensitivity 5 0 - 20 ng/L   Lower extremity venous duplex left   Result Value Ref Range    BSA 2.31 m2   Troponin I, High Sensitivity   Result Value Ref Range    Troponin I, High Sensitivity 6 0 - 20 ng/L     CT angio chest for pulmonary embolism    Result Date: 4/23/2024  Interpreted By:  Claudia Roca, STUDY: CT ANGIO CHEST FOR PULMONARY EMBOLISM;  4/23/2024 7:41 pm   INDICATION: Signs/Symptoms:chest pain sob.   COMPARISON: None    ACCESSION NUMBER(S): [id number]   ORDERING CLINICIAN: JIHAN VEGA   TECHNIQUE: Helical data acquisition of the chest was obtained after intravenous administration of 78 mL Omnipaque 350, as per PE protocol. Images were reformatted in coronal and sagittal planes. Axial and coronal maximum intensity projection (MIP) images were created and reviewed.   FINDINGS: POTENTIAL LIMITATIONS OF THE STUDY: None   HEART AND VESSELS:   There are no discrete filling defects within main pulmonary artery and its branches to suggest acute pulmonary embolism.   Main pulmonary artery and its branches are normal in caliber.   The thoracic aorta normal in course and caliber.   No coronary artery calcifications are seen. Please note, the study is not optimized for evaluation of coronary arteries.   The cardiac chambers are not enlarged.   There is no pericardial effusion seen.   MEDIASTINUM AND RADHA, LOWER NECK AND AXILLA:   The visualized thyroid gland is within normal limits.   No evidence of thoracic lymphadenopathy by CT criteria.   Esophagus appears within normal limits as seen.   LUNGS AND AIRWAYS: The trachea and central airways are patent. No endobronchial lesion is seen.   The bilateral lungs are clear without evidence of focal consolidation, pleural effusion, or pneumothorax.     UPPER ABDOMEN: Limited evaluation of visualized upper abdominal structures demonstrates enlarged liver with coarse appearance and decreased attenuation suggestive of hepatic steatosis and hepatomegaly. No focal masses within visualized liver accounting for the limitation of this exam.   CHEST WALL AND OSSEOUS STRUCTURES:   Chest wall is within normal limits. No acute osseous pathology.There are no suspicious osseous lesions.       1. No evidence of acute pulmonary embolism.     MACRO: None   Signed by: Claudia Roca 4/23/2024 8:29 PM Dictation workstation:   [id number]    Vascular US lower extremity venous duplex bilateral    Result Date:  4/23/2024  Interpreted By:  Claudia Roca, STUDY: [facility] US LOWER EXTREMITY VENOUS DUPLEX BILATERAL 4/23/2024 7:00 pm   INDICATION: 67 y/o  M with Signs/Symptoms:leg pain swelling. LMP:  Unknown.   COMPARISON: None.   ACCESSION NUMBER(S): [id number]   ORDERING CLINICIAN: JIHAN VEGA   TECHNIQUE: Routine ultrasound of the  bilateral lower extremities was performed with duplex Doppler (color and spectral) evaluation.   Static images were obtained for remote interpretation.   FINDINGS: THIGH VEINS:  The common femoral, femoral, popliteal, proximal medial saphenous, and deep femoral veins are patent and free of thrombus bilaterally.  The veins are normally compressible.  They demonstrate normal phasic flow and augmentation response bilaterally.   CALF VEINS:  The visualized peroneal and posterior tibial calf veins are patent bilaterally.       Negative study.  No sonographic evidence for deep venous thrombosis identified within the visualized portions of the deep venous system from the inguinal fossa to bilateral calf veins bilaterally.   Signed by: Claudia Roca 4/23/2024 7:19 PM Dictation workstation:   [id number]     Assessment/Plan   Principal Problem:  Possible Unstable Angina, Chest pain at Rest  Troponin 4, 5, 6, check another this am-f/U, EKG without any ST changes/abnormalities  -HEART Score 3  -ER said Camilo wanted further workup and was told he had a stress test 6 mo ago. Remains NPO until seen by cardiology in case needing a cath.  -Will trial nitroglycerin paste-f/U  Aspirin and high dose statin therapy started  Cardiology consulted-appreciate recs  ECHO Not ordered since cannot see private cardiology files to see when last completed-defer to cardiology  Lipid level-f/U  BNP <100, Mg level 1.9, TSH WNL  All home cardiac meds resumed  Cardiac diet/Daily weights/I/O's  Telemetry  Morphine once overnight/nitroglycerin prn  Rec outpt testing for DONITA with nocturia    Elevated D-dimer  -PE study neg  -BL  VTE US neg    HTN  Resume lisinopril  Started metoprolol XL 25 mg daily    Chronic Pain/Arthritis  Mobic held d/t contraindication with ACS    Obesity  Wt loss encouraged    Incidental CT Finding  Hepatic Steatosis-rec Low fat diet, wt loss and to f/U outpt with PCP for continued workup    DVT Px  SCDs, lovenox    Christianne HAYLEY Puga, APRN-CNP  62 min spent reviewing chart, labs, updating care plan, interviewing and assessing pt.

## 2024-04-25 LAB
EST. AVERAGE GLUCOSE BLD GHB EST-MCNC: 117 MG/DL
HBA1C MFR BLD: 5.7 %

## 2024-04-25 NOTE — SIGNIFICANT EVENT
Cath Lab Procedure Call Back  Procedure Date: _________4/24/2024______   Procedure Performed:_________LHC___________  Physician:______Dr.Krishnan_________  Spoke with:________Pt_____________________  Date/Time Contacted: 1st attempt: ____12:15_____ ___:____ 2nd attempt: _________ ___:____  Phone#:_______________ Unable to reach/Left message:____________  Access Site: Pain______Bleeding______Bruised______Infection______WNL____X__  Dressing Removal Date:_____4/25/2024_________ Anticoagulation Post Intervention_________  Satisfied with Care:_______X_________ D/C Instructions adequate______X_________  Follow Up Appointment:_______X______________ Length of Call:____3mins______________  Comments:______________________________________________________________________________________________________________________________________________

## 2024-04-29 NOTE — SIGNIFICANT EVENT
Follow Up Phone Call    Outgoing phone call    Spoke to: Alvaro Christensen Relationship:self   Phone number: [phone]      Outcome: contacted patient/ family   Chief Complaint   Patient presents with    Chest Pain     Started today after shoveling          Diagnosis:Not applicable    States he is feeling better. Cath site free of redness, pain, swelling or drainage.No further questions or concerns.

## 2024-05-31 ENCOUNTER — HOSPITAL ENCOUNTER (OUTPATIENT)
Dept: RESPIRATORY THERAPY | Facility: CLINIC | Age: 69
Discharge: HOME | End: 2024-05-31
Payer: COMMERCIAL

## 2024-05-31 DIAGNOSIS — R06.02 SOB (SHORTNESS OF BREATH): ICD-10-CM

## 2024-05-31 PROCEDURE — 94729 DIFFUSING CAPACITY: CPT | Performed by: INTERNAL MEDICINE

## 2024-05-31 PROCEDURE — 94729 DIFFUSING CAPACITY: CPT

## 2024-05-31 PROCEDURE — 94664 DEMO&/EVAL PT USE INHALER: CPT

## 2024-05-31 PROCEDURE — 98960 EDU&TRN PT SELF-MGMT NQHP 1: CPT

## 2024-05-31 PROCEDURE — 94760 N-INVAS EAR/PLS OXIMETRY 1: CPT

## 2024-05-31 PROCEDURE — 94060 EVALUATION OF WHEEZING: CPT | Performed by: INTERNAL MEDICINE

## 2024-05-31 PROCEDURE — 94727 GAS DIL/WSHOT DETER LNG VOL: CPT | Performed by: INTERNAL MEDICINE

## 2024-05-31 PROCEDURE — 94727 GAS DIL/WSHOT DETER LNG VOL: CPT

## 2024-05-31 PROCEDURE — 94060 EVALUATION OF WHEEZING: CPT

## 2024-06-12 NOTE — NURSING NOTE
PT discharged meds to bed delivered.  Discharge paperwork discussed with patient, no further questions. IV removed.  Belongings with patient.   stated

## 2024-09-13 ENCOUNTER — HOSPITAL ENCOUNTER (OUTPATIENT)
Dept: RADIOLOGY | Facility: HOSPITAL | Age: 69
Discharge: HOME | End: 2024-09-13
Payer: COMMERCIAL

## 2024-09-13 ENCOUNTER — HOSPITAL ENCOUNTER (OUTPATIENT)
Dept: RADIOLOGY | Facility: CLINIC | Age: 69
End: 2024-09-13
Payer: COMMERCIAL

## 2024-09-13 DIAGNOSIS — M25.561 PAIN IN RIGHT KNEE: ICD-10-CM

## 2024-09-13 PROCEDURE — 73700 CT LOWER EXTREMITY W/O DYE: CPT | Mod: RT

## 2024-09-18 ENCOUNTER — PREP FOR PROCEDURE (OUTPATIENT)
Dept: ANESTHESIOLOGY | Facility: HOSPITAL | Age: 69
End: 2024-09-18
Payer: COMMERCIAL

## 2024-09-18 DIAGNOSIS — M17.11 PRIMARY OSTEOARTHRITIS OF RIGHT KNEE: Primary | ICD-10-CM

## 2024-09-18 RX ORDER — SODIUM CHLORIDE, SODIUM LACTATE, POTASSIUM CHLORIDE, CALCIUM CHLORIDE 600; 310; 30; 20 MG/100ML; MG/100ML; MG/100ML; MG/100ML
20 INJECTION, SOLUTION INTRAVENOUS CONTINUOUS
OUTPATIENT
Start: 2024-09-18

## 2024-09-27 ENCOUNTER — PRE-ADMISSION TESTING (OUTPATIENT)
Dept: PREADMISSION TESTING | Facility: HOSPITAL | Age: 69
End: 2024-09-27
Payer: COMMERCIAL

## 2024-09-27 VITALS
HEART RATE: 85 BPM | TEMPERATURE: 98.4 F | WEIGHT: 243 LBS | SYSTOLIC BLOOD PRESSURE: 128 MMHG | OXYGEN SATURATION: 94 % | DIASTOLIC BLOOD PRESSURE: 92 MMHG | BODY MASS INDEX: 34.02 KG/M2 | RESPIRATION RATE: 16 BRPM | HEIGHT: 71 IN

## 2024-09-27 DIAGNOSIS — Z01.818 PREOP TESTING: Primary | ICD-10-CM

## 2024-09-27 DIAGNOSIS — M17.11 PRIMARY OSTEOARTHRITIS OF RIGHT KNEE: ICD-10-CM

## 2024-09-27 LAB
ANION GAP SERPL CALCULATED.3IONS-SCNC: 16 MMOL/L (ref 10–20)
BASOPHILS # BLD AUTO: 0.06 X10*3/UL (ref 0–0.1)
BASOPHILS NFR BLD AUTO: 0.7 %
BUN SERPL-MCNC: 16 MG/DL (ref 6–23)
CALCIUM SERPL-MCNC: 9.5 MG/DL (ref 8.6–10.3)
CHLORIDE SERPL-SCNC: 102 MMOL/L (ref 98–107)
CO2 SERPL-SCNC: 26 MMOL/L (ref 21–32)
CREAT SERPL-MCNC: 1.06 MG/DL (ref 0.5–1.3)
EGFRCR SERPLBLD CKD-EPI 2021: 77 ML/MIN/1.73M*2
EOSINOPHIL # BLD AUTO: 0.32 X10*3/UL (ref 0–0.7)
EOSINOPHIL NFR BLD AUTO: 3.7 %
ERYTHROCYTE [DISTWIDTH] IN BLOOD BY AUTOMATED COUNT: 12.3 % (ref 11.5–14.5)
GLUCOSE SERPL-MCNC: 101 MG/DL (ref 74–99)
HCT VFR BLD AUTO: 45 % (ref 41–52)
HGB BLD-MCNC: 15.2 G/DL (ref 13.5–17.5)
IMM GRANULOCYTES # BLD AUTO: 0.03 X10*3/UL (ref 0–0.7)
IMM GRANULOCYTES NFR BLD AUTO: 0.3 % (ref 0–0.9)
LYMPHOCYTES # BLD AUTO: 1.75 X10*3/UL (ref 1.2–4.8)
LYMPHOCYTES NFR BLD AUTO: 20.3 %
MCH RBC QN AUTO: 29.7 PG (ref 26–34)
MCHC RBC AUTO-ENTMCNC: 33.8 G/DL (ref 32–36)
MCV RBC AUTO: 88 FL (ref 80–100)
MONOCYTES # BLD AUTO: 0.7 X10*3/UL (ref 0.1–1)
MONOCYTES NFR BLD AUTO: 8.1 %
NEUTROPHILS # BLD AUTO: 5.77 X10*3/UL (ref 1.2–7.7)
NEUTROPHILS NFR BLD AUTO: 66.9 %
NRBC BLD-RTO: 0 /100 WBCS (ref 0–0)
PLATELET # BLD AUTO: 242 X10*3/UL (ref 150–450)
POTASSIUM SERPL-SCNC: 4.7 MMOL/L (ref 3.5–5.3)
RBC # BLD AUTO: 5.12 X10*6/UL (ref 4.5–5.9)
SODIUM SERPL-SCNC: 139 MMOL/L (ref 136–145)
WBC # BLD AUTO: 8.6 X10*3/UL (ref 4.4–11.3)

## 2024-09-27 PROCEDURE — 99203 OFFICE O/P NEW LOW 30 MIN: CPT | Performed by: NURSE PRACTITIONER

## 2024-09-27 PROCEDURE — 87081 CULTURE SCREEN ONLY: CPT | Mod: TRILAB,WESLAB

## 2024-09-27 PROCEDURE — 36415 COLL VENOUS BLD VENIPUNCTURE: CPT

## 2024-09-27 PROCEDURE — 85025 COMPLETE CBC W/AUTO DIFF WBC: CPT

## 2024-09-27 PROCEDURE — 82374 ASSAY BLOOD CARBON DIOXIDE: CPT

## 2024-09-27 RX ORDER — OXYCODONE HYDROCHLORIDE 10 MG/1
10 TABLET ORAL EVERY 8 HOURS PRN
COMMUNITY
Start: 2024-09-08

## 2024-09-27 RX ORDER — CHLORHEXIDINE GLUCONATE ORAL RINSE 1.2 MG/ML
SOLUTION DENTAL
Qty: 473 ML | Refills: 0 | Status: SHIPPED | OUTPATIENT
Start: 2024-09-27 | End: 2024-10-16

## 2024-09-27 RX ORDER — FUROSEMIDE 20 MG/1
0.5 TABLET ORAL
COMMUNITY
Start: 2024-06-05

## 2024-09-27 ASSESSMENT — DUKE ACTIVITY SCORE INDEX (DASI)
DASI METS SCORE: 7.3
CAN YOU WALK INDOORS, SUCH AS AROUND YOUR HOUSE: YES
TOTAL_SCORE: 36.7
CAN YOU RUN A SHORT DISTANCE: NO
CAN YOU DO HEAVY WORK AROUND THE HOUSE LIKE SCRUBBING FLOORS OR LIFTING AND MOVING HEAVY FURNITURE: YES
CAN YOU DO LIGHT WORK AROUND THE HOUSE LIKE DUSTING OR WASHING DISHES: YES
CAN YOU WALK A BLOCK OR TWO ON LEVEL GROUND: YES
CAN YOU CLIMB A FLIGHT OF STAIRS OR WALK UP A HILL: YES
CAN YOU PARTICIPATE IN STRENOUS SPORTS LIKE SWIMMING, SINGLES TENNIS, FOOTBALL, BASKETBALL, OR SKIING: NO
CAN YOU DO MODERATE WORK AROUND THE HOUSE LIKE VACUUMING, SWEEPING FLOORS OR CARRYING GROCERIES: YES
CAN YOU PARTICIPATE IN MODERATE RECREATIONAL ACTIVITIES LIKE GOLF, BOWLING, DANCING, DOUBLES TENNIS OR THROWING A BASEBALL OR FOOTBALL: NO
CAN YOU TAKE CARE OF YOURSELF (EAT, DRESS, BATHE, OR USE TOILET): YES
CAN YOU HAVE SEXUAL RELATIONS: YES
CAN YOU DO YARD WORK LIKE RAKING LEAVES, WEEDING OR PUSHING A MOWER: YES

## 2024-09-27 ASSESSMENT — ENCOUNTER SYMPTOMS
GASTROINTESTINAL NEGATIVE: 1
ARTHRALGIAS: 1
EYES NEGATIVE: 1
ENDOCRINE NEGATIVE: 1
HEMATOLOGIC/LYMPHATIC NEGATIVE: 1
RESPIRATORY NEGATIVE: 1
PSYCHIATRIC NEGATIVE: 1
CONSTITUTIONAL NEGATIVE: 1
ALLERGIC/IMMUNOLOGIC NEGATIVE: 1
BACK PAIN: 1
NEUROLOGICAL NEGATIVE: 1

## 2024-09-27 NOTE — H&P (VIEW-ONLY)
CPM/PAT Evaluation       Name: Alvaro Christensen (Alvaro Christensen)  /Age: 1955/69 y.o.     In-Person       Chief Complaint: Right knee pain     HPI    Pt is a 69 year old male with right knee osteoarthritis. Pt reports he started having right knee pain in May. Pt reports he strained his right knee in May and the pain has not improved. Pt describes the pain as a constant aching pain that radiates to his right calf. The pain is aggravated with walking, prolonged standing and ROM of his right knee. Pt reports at times his right knee feels unstable but he denies falls. Pt has been managing his knee pain with meloxicam and Roxicodone. Pt was examined by his surgeon and has been scheduled for right total knee arthroplasty. Pt denies CP, SOB, or dizziness.     Pt as hospitalized in April for CP, SOB, and peripheral edema. Pt had a left heart cath that showed: Mild non-obstructive coronary artery disease. At that time the patient was taking Norvasc and it was determined that the medication was causing his peripheral edema. Pt was switched from Norvasc to furosemide. Pt continues to follow with his cardiologist Dr Treviño as an outpatient.     Past Medical History:   Diagnosis Date    Chronic lower back pain     Delayed emergence from general anesthesia     GERD (gastroesophageal reflux disease)     Hearing loss     History of peripheral edema     Hypertension     OA (osteoarthritis)        Past Surgical History:   Procedure Laterality Date    CARDIAC CATHETERIZATION N/A 2024    Procedure: Left Heart Cath;  Surgeon: Camilo Treviño MD;  Location: [facility] Cardiac Cath Lab;  Service: Cardiovascular;  Laterality: N/A;    OTHER SURGICAL HISTORY  2020    Lumbar Back surgery    OTHER SURGICAL HISTORY Left 2020    Left [place] Hip resurfacing     Social History     Tobacco Use    Smoking status: Former     Current packs/day: 0.00     Average packs/day: 2.0 packs/day for 32.1 years (64.2  ttl pk-yrs)     Types: Cigarettes     Start date: 1982     Quit date: 2/2/2014     Years since quitting: 10.6     Passive exposure: Past    Smokeless tobacco: Never   Substance Use Topics    Alcohol use: Never     Social History     Substance and Sexual Activity   Drug Use Never       Patient  has no history on file for sexual activity.    No family history on file.    Allergies   Allergen Reactions    Penicillins Anaphylaxis and Other    Sulfa (Sulfonamide Antibiotics) Anaphylaxis     Current Outpatient Medications   Medication Sig Dispense Refill    furosemide (Lasix) 20 mg tablet Take 0.5 tablets (10 mg) by mouth once daily.      oxyCODONE (Roxicodone) 10 mg immediate release tablet Take 1 tablet (10 mg) by mouth every 8 hours if needed.      aspirin 81 mg EC tablet Take 1 tablet (81 mg) by mouth once daily.      chlorhexidine (Peridex) 0.12 % solution Use as directed. 473 mL 0    diclofenac sodium (Voltaren) 1 % gel Apply 2.25 inches (2 g) topically 4 times a day.      lansoprazole (Prevacid) 15 mg DR capsule Take 1 capsule (15 mg) by mouth once daily in the morning. Take before meals.      lisinopril 10 mg tablet Take 1 tablet (10 mg) by mouth once daily.      meloxicam (Mobic) 15 mg tablet Take 1 tablet (15 mg) by mouth once daily.      multivit-min/iron/folic acid/K (ADULTS MULTIVITAMIN ORAL) Take 1 tablet by mouth once daily.       No current facility-administered medications for this visit.     Review of Systems   Constitutional: Negative.    HENT:  Positive for hearing loss.    Eyes: Negative.    Respiratory: Negative.     Cardiovascular:  Positive for leg swelling.   Gastrointestinal: Negative.    Endocrine: Negative.    Genitourinary: Negative.    Musculoskeletal:  Positive for arthralgias and back pain.   Skin: Negative.    Allergic/Immunologic: Negative.    Neurological: Negative.    Hematological: Negative.    Psychiatric/Behavioral: Negative.       BP (!) 128/92   Pulse 85   Temp 36.9 °C (98.4 °F)  "(Temporal)   Resp 16   Ht 1.803 m (5' 11\")   Wt 110 kg (243 lb)   SpO2 94%   BMI 33.89 kg/m²     Physical Exam  Vitals reviewed.   Constitutional:       Appearance: Normal appearance. He is obese.   HENT:      Head: Normocephalic and atraumatic.      Nose: Nose normal.      Mouth/Throat:      Mouth: Mucous membranes are moist.      Pharynx: Oropharynx is clear.   Eyes:      Extraocular Movements: Extraocular movements intact.      Conjunctiva/sclera: Conjunctivae normal.      Pupils: Pupils are equal, round, and reactive to light.   Cardiovascular:      Rate and Rhythm: Normal rate and regular rhythm.      Pulses: Normal pulses.      Heart sounds: Normal heart sounds.   Pulmonary:      Effort: Pulmonary effort is normal.      Breath sounds: Normal breath sounds.   Abdominal:      General: Bowel sounds are normal.      Palpations: Abdomen is soft.   Musculoskeletal:      Cervical back: Normal range of motion and neck supple.      Right lower leg: Edema (trace edema) present.      Left lower leg: Edema (trace edema) present.      Comments: Right knee pain with ROM. Crepitus palpated over right knee with ROM   Skin:     General: Skin is warm and dry.   Neurological:      General: No focal deficit present.      Mental Status: He is alert and oriented to person, place, and time. Mental status is at baseline.   Psychiatric:         Mood and Affect: Mood normal.         Behavior: Behavior normal.         Thought Content: Thought content normal.         Judgment: Judgment normal.        PAT AIRWAY:   Airway:     Mallampati::  III    TM distance::  >3 FB    Neck ROM::  Full   Lower partial   upper dentures    ASA: 3  DASI: 36.7  METS: 7.3  CHADS: 2.8%  RCRI: 0.9%  STOP BAN    Assessment and Plan:     Primary osteoarthritis of right knee: OPEN TOTAL KNEE ARTHROPLASTY right.  HTN: Pt is managed with lisinopril.  CAD: Mild non-obstructive coronary artery disease- on left heart cath 2024  Peripheral edema: Pt is " taking furosemide.  GERD: managed with lansoprazole.  Chronic back pain: Pt takes as needed Roxicodone.     CBC and BMP collected in PAT.    EKG completed on 5/10/2024.    ECHO 10/17/2023:  EF: 65%; Mild aortic regurgitation, abnormal ventricular relaxation.    Left heart cath 4/24/2024:  CONCLUSIONS:   1. Mild non-obstructive coronary artery disease.   2. Culprit vessel(s): right coronary artery.    Cardiac clearance from Dr Camilo Treviño done 9/30/2024 - clearance scanned into MEDIA TAB    JOE Michaels-CNP

## 2024-09-27 NOTE — CPM/PAT H&P
CPM/PAT Evaluation       Name: Alvaro Christensen (Alvaro Christensen)  /Age: 1955/69 y.o.     In-Person       Chief Complaint: Right knee pain     HPI    Pt is a 69 year old male with right knee osteoarthritis. Pt reports he started having right knee pain in May. Pt reports he strained his right knee in May and the pain has not improved. Pt describes the pain as a constant aching pain that radiates to his right calf. The pain is aggravated with walking, prolonged standing and ROM of his right knee. Pt reports at times his right knee feels unstable but he denies falls. Pt has been managing his knee pain with meloxicam and Roxicodone. Pt was examined by his surgeon and has been scheduled for right total knee arthroplasty. Pt denies CP, SOB, or dizziness.     Pt as hospitalized in April for CP, SOB, and peripheral edema. Pt had a left heart cath that showed: Mild non-obstructive coronary artery disease. At that time the patient was taking Norvasc and it was determined that the medication was causing his peripheral edema. Pt was switched from Norvasc to furosemide. Pt continues to follow with his cardiologist Dr Treviño as an outpatient.     Past Medical History:   Diagnosis Date    Chronic lower back pain     Delayed emergence from general anesthesia     GERD (gastroesophageal reflux disease)     Hearing loss     History of peripheral edema     Hypertension     OA (osteoarthritis)        Past Surgical History:   Procedure Laterality Date    CARDIAC CATHETERIZATION N/A 2024    Procedure: Left Heart Cath;  Surgeon: Camilo Treviño MD;  Location: [facility] Cardiac Cath Lab;  Service: Cardiovascular;  Laterality: N/A;    OTHER SURGICAL HISTORY  2020    Lumbar Back surgery    OTHER SURGICAL HISTORY Left 2020    Left [place] Hip resurfacing     Social History     Tobacco Use    Smoking status: Former     Current packs/day: 0.00     Average packs/day: 2.0 packs/day for 32.1 years (64.2  ttl pk-yrs)     Types: Cigarettes     Start date: 1982     Quit date: 2/2/2014     Years since quitting: 10.6     Passive exposure: Past    Smokeless tobacco: Never   Substance Use Topics    Alcohol use: Never     Social History     Substance and Sexual Activity   Drug Use Never       Patient  has no history on file for sexual activity.    No family history on file.    Allergies   Allergen Reactions    Penicillins Anaphylaxis and Other    Sulfa (Sulfonamide Antibiotics) Anaphylaxis     Current Outpatient Medications   Medication Sig Dispense Refill    furosemide (Lasix) 20 mg tablet Take 0.5 tablets (10 mg) by mouth once daily.      oxyCODONE (Roxicodone) 10 mg immediate release tablet Take 1 tablet (10 mg) by mouth every 8 hours if needed.      aspirin 81 mg EC tablet Take 1 tablet (81 mg) by mouth once daily.      chlorhexidine (Peridex) 0.12 % solution Use as directed. 473 mL 0    diclofenac sodium (Voltaren) 1 % gel Apply 2.25 inches (2 g) topically 4 times a day.      lansoprazole (Prevacid) 15 mg DR capsule Take 1 capsule (15 mg) by mouth once daily in the morning. Take before meals.      lisinopril 10 mg tablet Take 1 tablet (10 mg) by mouth once daily.      meloxicam (Mobic) 15 mg tablet Take 1 tablet (15 mg) by mouth once daily.      multivit-min/iron/folic acid/K (ADULTS MULTIVITAMIN ORAL) Take 1 tablet by mouth once daily.       No current facility-administered medications for this visit.     Review of Systems   Constitutional: Negative.    HENT:  Positive for hearing loss.    Eyes: Negative.    Respiratory: Negative.     Cardiovascular:  Positive for leg swelling.   Gastrointestinal: Negative.    Endocrine: Negative.    Genitourinary: Negative.    Musculoskeletal:  Positive for arthralgias and back pain.   Skin: Negative.    Allergic/Immunologic: Negative.    Neurological: Negative.    Hematological: Negative.    Psychiatric/Behavioral: Negative.       BP (!) 128/92   Pulse 85   Temp 36.9 °C (98.4 °F)  "(Temporal)   Resp 16   Ht 1.803 m (5' 11\")   Wt 110 kg (243 lb)   SpO2 94%   BMI 33.89 kg/m²     Physical Exam  Vitals reviewed.   Constitutional:       Appearance: Normal appearance. He is obese.   HENT:      Head: Normocephalic and atraumatic.      Nose: Nose normal.      Mouth/Throat:      Mouth: Mucous membranes are moist.      Pharynx: Oropharynx is clear.   Eyes:      Extraocular Movements: Extraocular movements intact.      Conjunctiva/sclera: Conjunctivae normal.      Pupils: Pupils are equal, round, and reactive to light.   Cardiovascular:      Rate and Rhythm: Normal rate and regular rhythm.      Pulses: Normal pulses.      Heart sounds: Normal heart sounds.   Pulmonary:      Effort: Pulmonary effort is normal.      Breath sounds: Normal breath sounds.   Abdominal:      General: Bowel sounds are normal.      Palpations: Abdomen is soft.   Musculoskeletal:      Cervical back: Normal range of motion and neck supple.      Right lower leg: Edema (trace edema) present.      Left lower leg: Edema (trace edema) present.      Comments: Right knee pain with ROM. Crepitus palpated over right knee with ROM   Skin:     General: Skin is warm and dry.   Neurological:      General: No focal deficit present.      Mental Status: He is alert and oriented to person, place, and time. Mental status is at baseline.   Psychiatric:         Mood and Affect: Mood normal.         Behavior: Behavior normal.         Thought Content: Thought content normal.         Judgment: Judgment normal.        PAT AIRWAY:   Airway:     Mallampati::  III    TM distance::  >3 FB    Neck ROM::  Full   Lower partial   upper dentures    ASA: 3  DASI: 36.7  METS: 7.3  CHADS: 2.8%  RCRI: 0.9%  STOP BAN    Assessment and Plan:     Primary osteoarthritis of right knee: OPEN TOTAL KNEE ARTHROPLASTY right.  HTN: Pt is managed with lisinopril.  CAD: Mild non-obstructive coronary artery disease- on left heart cath 2024  Peripheral edema: Pt is " taking furosemide.  GERD: managed with lansoprazole.  Chronic back pain: Pt takes as needed Roxicodone.     CBC and BMP collected in PAT.    EKG completed on 5/10/2024.    ECHO 10/17/2023:  EF: 65%; Mild aortic regurgitation, abnormal ventricular relaxation.    Left heart cath 4/24/2024:  CONCLUSIONS:   1. Mild non-obstructive coronary artery disease.   2. Culprit vessel(s): right coronary artery.    Cardiac clearance from Dr Camilo Treviño done 9/30/2024 - clearance scanned into MEDIA TAB    JOE Michaels-CNP

## 2024-09-27 NOTE — PREPROCEDURE INSTRUCTIONS
Why must I stop eating and drinking near surgery time?  With sedation, food or liquid in your stomach can enter your lungs causing serious complications  Increases nausea and vomiting    When do I need to stop eating and drinking before my surgery?   Do not eat or drink after midnight the night before your surgery/procedure.  You may have small sips of water to take your medication.    PAT DISCHARGE INSTRUCTIONS    Please call the Same Day Surgery (SDS) Department of the hospital where your procedure will be performed after 2:00 PM the day before your surgery. If you are scheduled on a Monday, or a Tuesday following a Monday holiday, you will need to call on the last business day prior to your surgery.      [facility]  [address]  [place] [id number]  [phone]    Please let your surgeon know if:      You develop any open sores, shingles, burning or painful urination as these may increase your risk of an infection.   You no longer wish to have the surgery.   Any other personal circumstances change that may lead to the need to cancel or defer this surgery-such as being sick or getting admitted to any hospital within one week of your planned procedure.    Your contact details change, such as a change of address or phone number.    Starting now:     Please DO NOT drink alcohol or smoke for 24 hours before surgery. It is well known that quitting smoking can make a huge difference to your health and recovery from surgery. The longer you abstain from smoking, the better your chances of a healthy recovery. If you need help with quitting, call 5-153-QUIT-NOW to be connected to a trained counselor who will discuss the best methods to help you quit.     Before your surgery:    Please stop all supplements 7 days prior to surgery. Or as directed by your surgeon.   Please stop taking NSAID pain medicine such as Advil and Motrin 7 days before surgery.    If you develop any  fever, cough, cold, rashes, cuts, scratches, scrapes, urinary symptoms or infection anywhere on your body (including teeth and gums) prior to surgery, please call your surgeon’s office as soon as possible. This may require treatment to reduce the chance of cancellation on the day of surgery.    The day before your surgery:   DIET- Please follow the diet instructions at the top of your packet.   Get a good night’s rest.  Use the special soap for bathing if you have been instructed to use one.    Scheduled surgery times may change and you will be notified if this occurs - please check your personal voicemail for any updates.     On the morning of surgery:   Wear comfortable, loose fitting clothes which open in the front. Please do not wear moisturizers, creams, lotions, makeup or perfume.    Please bring with you to surgery:   Photo ID and insurance card   Current list of medicines and allergies   Pacemaker/ Defibrillator/Heart stent cards   CPAP machine and mask    Slings/ splints/ crutches   A copy of your complete advanced directive/DHPOA.    Please do NOT bring with you to surgery:   All jewelry and valuables should be left at home.   Prosthetic devices such as contact lenses, hearing aids, dentures, eyelash extensions, hairpins and body piercings must be removed prior to going in to the surgical suite.    After outpatient surgery:   A responsible adult MUST accompany you at the time of discharge and stay with you for 24 hours after your surgery. You may NOT drive yourself home after surgery.    Do not drive, operate machinery, make critical decisions or do activities that require co-ordination or balance until after a night’s sleep.   Do not drink alcoholic beverages for 24 hours.   Instructions for resuming your medications will be provided by your surgeon.    CALL YOUR DOCTOR AFTER SURGERY IF YOU HAVE:     Chills and/or a fever of 101° F or higher.    Redness, swelling, pus or drainage from your surgical wound  or a bad smell from the wound.    Lightheadedness, fainting or confusion.    Persistent vomiting (throwing up) and are not able to eat or drink for 12 hours.    Three or more loose, watery bowel movements in 24 hours (diarrhea).   Difficulty or pain while urinating( after non-urological surgery)    Pain and swelling in your legs, especially if it is only on one side.    Difficulty breathing or are breathing faster than normal.    Any new concerning symptoms.        Patient Information: Pre-Operative Infection Prevention Measures     Why did I have my nose, under my arms, and groin swabbed?  The purpose of the swab is to identify Staphylococcus aureus inside your nose or on your skin.  The swab was sent to the laboratory for culture.  A positive swab/culture for Staphylococcus aureus is called colonization or carriage.      What is Staphylococcus aureus?  Staphylococcus aureus, also known as “staph”, is a germ found on the skin or in the nose of healthy people.  Sometimes Staphylococcus aureus can get into the body and cause an infection.  This can be minor (such as pimples, boils, or other skin problems).  It might also be serious (such as a blood infection, pneumonia, or a surgical site infection).    What is Staphylococcus aureus colonization or carriage?  Colonization or carriage means that a person has the germ but is not sick from it.  These bacteria can be spread on the hands or when breathing or sneezing.    How is Staphylococcus aureus spread?  It is most often spread by close contact with a person or item that carries it.    What happens if my culture is positive for Staphylococcus aureus?  Your doctor/medical team will use this information to guide any antibiotic treatment which may be necessary.  Regardless of the culture results, we will clean the inside of your nose with a betadine swab just before you have your surgery.      Will I get an infection if I have Staphylococcus aureus in my nose or on my  skin?  Anyone can get an infection with Staphylococcus aureus.  However, the best way to reduce your risk of infection is to follow the instructions provided to you for the use of your CHG soap and dental rinse.        Patient Information: Oral/Dental Rinse    What is oral/dental rinse?   It is a mouthwash. It is a way of cleaning the mouth with a germ-killing solution before your surgery.  The solution contains chlorhexidine, commonly known as CHG.   It is used inside the mouth to kill a bacteria known as Staphylococcus aureus.  Let your doctor know if you are allergic to Chlorhexidine.    Why do I need to use CHG oral/dental rinse?  The CHG oral/dental rinse helps to kill a bacteria in your mouth known as Staphylococcus aureus.     This reduces the risk of infection at the surgical site.      Using your CHG oral/dental rinse  STEPS:  Use your CHG oral/dental rinse after you brush your teeth the night before (at bedtime) and the morning of your surgery.  Follow all directions on your prescription label.    Use the cap on the container to measure 15ml   Swish (gargle if you can) the mouthwash in your mouth for at least 30 seconds, (do not swallow) and spit out  After you use your CHG rinse, do not rinse your mouth with water, drink or eat.  Please refer to the prescription label for the appropriate time to resume oral intake      What side effects might I have using the CHG oral/dental rinse?  CHG rinse will stick to plaque on the teeth.  Brush and floss just before use.  Teeth brushing will help avoid staining of plaque during use.      Patient Information: Home Preoperative Antibacterial Shower      What is a home preoperative antibacterial shower?  This shower is a way of cleaning the skin with a germ-killing solution before surgery.  The solution contains chlorhexidine, commonly known as CHG.  CHG is a skin cleanser with germ-killing ability.  Let your doctor know if you are allergic to chlorhexidine.    Why do  I need to take a preoperative antibacterial shower?  Skin is not sterile.  It is best to try to make your skin as free of germs as possible before surgery.  Proper cleansing with a germ-killing soap before surgery can lower the number of germs on your skin.  This helps to reduce the risk of infection at the surgical site.  Following the instructions listed below will help you prepare your skin for surgery.      How do I use the solution?  Steps:  Begin using your CHG soap 5 days before your scheduled surgery on ________________________.    First, wash and rinse your hair using the CHG soap. Keep CHG soap away from ear canals and eyes.  Rinse completely, do not condition.  Hair extensions should be removed.  Wash your face with your normal soap and rinse.    Apply the CHG solution to a clean wet washcloth.  Turn the water off or move away from the water spray to avoid premature rinsing of the CHG soap as you are applying.   Firmly lather your entire body from the neck down.  Do not use on your face.  Pay special attention to the area(s) where your incision(s) will be located unless they are on your face.  Avoid scrubbing your skin too hard.  The important point is to have the CHG soap sit on your skin for 3 minutes.    When the 3 minutes are up, turn on the water and rinse the CHG solution off your body completely.   DO NOT wash with regular soap after you have used the CHG soap solution  Pat yourself dry with a clean, freshly-laundered towel.  DO NOT apply powders, deodorants, or lotions.  Dress in clean, freshly laundered nightclothes.    Be sure to sleep with clean, freshly laundered sheets.  Be aware that CHG will cause stains on fabrics; if you wash them with bleach after use.  Rinse your washcloth and other linens that have contact with CHG completely.  Use only non-chlorine detergents to launder the items used.   The morning of surgery is the fifth day.  Repeat the above steps and dress in clean comfortable  clothing     Whom should I contact if I have any questions regarding the use of CHG soap?  Call the [facility] at [phone] if you have any questions.                  Medication List            Accurate as of September 27, 2024 10:56 AM. Always use your most recent med list.                ADULTS MULTIVITAMIN ORAL  Additional Medication Adjustments for Surgery: Take last dose 7 days before surgery     aspirin 81 mg EC tablet  Additional Medication Adjustments for Surgery: Take last dose 7 days before surgery     chlorhexidine 0.12 % solution  Commonly known as: Peridex  Use as directed.  Medication Adjustments for Surgery: Take/Use as prescribed     diclofenac sodium 1 % gel  Commonly known as: Voltaren  Additional Medication Adjustments for Surgery: Take last dose 7 days before surgery     furosemide 20 mg tablet  Commonly known as: Lasix  Medication Adjustments for Surgery: Take last dose 1 day (24 hours) before surgery     lansoprazole 15 mg DR capsule  Commonly known as: Prevacid  Medication Adjustments for Surgery: Take on the morning of surgery     lisinopril 10 mg tablet  Medication Adjustments for Surgery: Take last dose 1 day (24 hours) before surgery     meloxicam 15 mg tablet  Commonly known as: Mobic  Additional Medication Adjustments for Surgery: Take last dose 7 days before surgery     oxyCODONE 10 mg immediate release tablet  Commonly known as: Roxicodone  Medication Adjustments for Surgery: Take/Use as prescribed

## 2024-09-29 LAB — STAPHYLOCOCCUS SPEC CULT: NORMAL

## 2024-10-04 ENCOUNTER — TELEPHONE (OUTPATIENT)
Dept: ORTHOPEDIC SURGERY | Facility: HOSPITAL | Age: 69
End: 2024-10-04
Payer: COMMERCIAL

## 2024-10-04 NOTE — TELEPHONE ENCOUNTER
Thank you for taking my call today.  All questions were answered at the time of the call, but please feel free to reach out to me via [employer] or phone, [phone], with any new questions or concerns.       We confirmed that you opted to enroll in our [id number] program so your discharge prescriptions will be available to take home at the time of discharge.  Please bring any prescription insurance coverage with you on the morning of surgery so that we can enter the information into our system.     We confirmed that your plan would be to Discharge Home same day (Rapid Recovery).    We confirmed that you have DME needed for recovery.     Use the provided body wash for 4 days before surgery and complete a 5th shower on the morning of surgery, this includes your body and hair.  Follow the directions as provided during preadmission testing.  The mouth wash will be used the night before and the morning of surgery.       As a reminder, if you do not hear from our team, please call [phone] between 2pm and 3pm the business day before your surgery to confirm your arrival time and details.        Please don't hesitate to reach out with additional questions or concerns.    Shantell Torres MBA, BSN, RN-BC  GERARDO VictorN, RN  [facility]  [facility]  [phone]

## 2024-10-08 ENCOUNTER — APPOINTMENT (OUTPATIENT)
Dept: ORTHOPEDIC SURGERY | Facility: HOSPITAL | Age: 69
End: 2024-10-08
Payer: COMMERCIAL

## 2024-10-09 ENCOUNTER — EDUCATION (OUTPATIENT)
Dept: ORTHOPEDIC SURGERY | Facility: HOSPITAL | Age: 69
End: 2024-10-09
Payer: COMMERCIAL

## 2024-10-09 NOTE — GROUP NOTE
In addition to the group class activities, Alvaro Christensen had the following lab work completed:  No orders of the defined types were placed in this encounter.      A new History and Physical was not completed.    This class lasted approximately 2 hours and had 7 participants. The nurse instructor covered the following topics:    MyChart Enrollment  Communication with Care Team  My Chart is the best form of communication to reach all of your caregivers  You can send messages to specific care givers, or a care team  Continued Education  You will be enrolled in a Total Joint Replacement care plan to receive additional education before and after surgery  You can review a short recording of the class content  Access to Medical Records  You can access test results, office notes, appointments, etc.  You can connect to other healthcare systems who use [employer] ([facility], [facility], [facility], etc.)  [employer]  Program Information  Consent to Enroll    Background/Understanding of Joint Replacement Surgery  Potential for same day discharge  Any questions or concerns to be directed to the surgeon's office    How to Prepare for Surgery  Use of Nicotine Products/Smoking  Stop several weeks before surgery  Such products slow down the healing process and increase risk of post-op infection and complications  Clearance for Surgery  Medical Clearance by Specialists  Dental Clearance  Cracked/Broken/Loose teeth left untreated may postpone surgery  The importance of post-op antibiotics for dental visits per surgeon protocol  Preadmission Testing  **Potential for postponed surgery if appropriate clearance is not obtained  Medication Instruction  Follow instructions provided by the doctor who prescribes your medication (typically, but not limited to cardiologist)  Preadmission testing will provide additional instructions during your appointment on what to stop and what to take as you get closer to surgery  For clarification of these  instructions, please call preadmission testing directly - [phone]  Tips for Preparing the home for discharge from the hospital  Care Partner  Requirement for surgery, the patient must have a plan to have help at home  Potential for postponed surgery if plan for home support cannot be established  How the care partner can help after surgery  CHG Body Wash/Mouth Wash  Follow the instructions given at preadmission testing  Body wash is to be used on the body and hair for 5 washes  Mouthwash is to be used the night before and morning of surgery  **This is a system-wide protocol developed by infectious disease professionals, we will not alter our recommendations for those with sensitive skin or those who have special hair needs.  Please follow the instructions as they are written as this will provide the best infection prevention measures for surgery.  Should you have an allergy to one of the products, please discuss with your preadmission team**    What to Expect in the Hospital/At Home  Morning of Surgery NPO Guidelines  Nothing to eat after midnight  Water can be consumed up to 2 hours prior to arrival  Surgical and Post-Surgical Care Team  Surgical Team  Anesthesia Team  Nursing  Physical Therapy  Care Coordinating  Pharmacy  Hospital Arrival Instructions  Arrive at the time provided to you  Consider traffic patterns (rush-hour) based on arrival time  Have arrangements made for a ride home  If discharging same day, care partner should remain at the hospital  Recovering after Surgery  Recovery Room - Visitors are not brought back  Transition to hospital room - 2nd Floor, Visitors will be directed to your room  The presence of and strategies for controlling surgical pain and swelling  The importance of early mobility  Side effects after surgery  What to expect if staying overnight    Discharge Planning  The intended plan for discharge will be for patients to discharge home  All patients require a care partner  (family, friend, neighbor, etc.) to stay with the patient for the first few nights after surgery  The inability to secure help at home will postpone surgery  Home Care Services set up per surgeon order  Physical Therapy  Occupational Therapy  **If desired, private duty care can be arranged by the patient ahead of time**  Outpatient Physical Therapy per surgeon order    Recovering at Home  Wound Care  Follow wound care instructions found in your discharge paperwork  Bandage is water-resistant and you may shower with the bandage  Do not scrub directly over the bandage  Do not submerge in water until cleared (bathtub, hot tub, pool, etc.)    Post-Op Risk Prevention  Infection Prevention  Promptly seek treatment for any infections post-operatively  Routine dental visits must be postponed for 3 months after surgery  Your surgeon may require antibiotics prior to future dental visits  Any concerns for infection not related directly to the knee or the hip should be managed by your primary care provider  Blood Clots  Be sure to complete the course of blood thinning medication as prescribed by your surgeon  Movement every 1-2 hours during the day is encouraged to prevent blood clots  Monitor for signs of blood clots  Wear compression stockings as prescribed by your surgeon  Constipation  Constipation is common following surgery  Drink plenty of fluids  Take stool softener/laxative as prescribed by your surgeon  Move around frequently  Eat foods high in fiber  Fall Prevention  Prepare home ahead of time to clear space to move with walker  Remove throw rugs and electrical cords from walkways  Install railings near any stairways with more than 2 steps  Use night lights for increased visibility at night  Continue to use your assistive device until cleared by surgeon or physical therapy  Dislocation Prevention - Not all procedures will have dislocation precautions  Follow dislocation precautions provided by your surgeon  It is OK  to resume sexual activity about 6 weeks following surgery  Be sure to follow any dislocation precautions assigned    Durable Medical Equipment  Cold Therapy  Breg Cold Therapy Machines  Ice/Gel Packs  Assistive Devices  Folding Walker with Wheels (in the front only)  No Rollators  Crutches if approved by Physical Therapy and Surgeon after surgery  Hip Kits  Raised Toilet Seats  Additional Compression Stockings    Joint Preservation  Healthy Activities when Cleared  Walking  Swimming  Bike Riding  Activities to Avoid  Refrain from repetitive motions which have a high impact on the joint  Gradual Progression  Progress activity slowly, listen to your body  Common Findings - NORMAL after surgery  Clicking/Grinding  Numbness near incision    Physical Therapy  Prehabilitation exercises  START TODAY ON BOTH LEGS  Surgery Specific Precautions  Follow surgery specific precautions found in your discharge paperwork    Follow-Up Visit  All patients will see their surgeon for a follow up visit after surgery  The visit may range from 2-6 weeks after surgery and is surgeon specific      Please don't hesitate to reach out if you have any additional questions or concerns.    Shantell Torres MBA, BSN, RN-BC  Maricruz Morris RN  Or[facility] Program Navigators  [facility]   [phone]

## 2024-10-15 NOTE — DISCHARGE INSTRUCTIONS
[facility]                          Shantell Torres MBA, BSN, RN-BC Orthopedic  Phone: [phone]    Kalpesh Collado D.O.  Phone: [phone]    POSTOPERATIVE INSTRUCTIONS: TOTAL HIP & TOTAL KNEE ARTHROPLASTY  General/highlights: Ice and elevate as often as possible.  When elevating keep the knee straight with the foot higher than the hip.  Wear the ROSAMARIA hose/stockings during the daytime for the next 14 days, remove at night.  Flex/tighten the muscles in the buttocks, thighs and calves often when in chair or bed.  Utilize the incentive spirometer often especially the next 3 days.  Walk at least 10 steps every hour that you are awake.  Take stairs 1 at a time, good leg leads up, bed leg leadsdown, use the handrails.  You may be weightbearing as tolerated, in general the walker is used for 2 weeks.  New discharge medications: Percocet to be taken as needed only for pain.  Maintain your normal dose of oxycodone 10 mg 3 times a day.  Baclofen as needed for muscle spasm.  MiraLAX powder when taking the additional Percocet to avoid constipation.  Enteric-coated aspirin 81 mg by mouth twice a day for the next 2 weeks, this is your blood thinner is very important to take  PAIN, SWELLING & BRUISING  Some pain, stiffness and swelling is normal for up to 1 year after surgery.  Pain will start to let up over time depending on your activity level.  It is preferable to rest for 24 hours following your surgery  Pain is often delayed for 24-48 hours after surgery.  Pain may be dull/achy, throbbing, or even sharp/nerve sensations  It is normal for swelling and bruising to worsen before it gets better.  These symptoms usually peak 1 week after surgery  Swelling and bruising may appear throughout the leg, all the way down to your toes  Wear your compression stockings every day during the day for 14 days and remove them at night.  This will help control swelling    WOUND CARE  INSTRUCTIONS  Your surgical bandage will be removed 2 weeks after surgery at your post-operative visit.  If your bandage becomes compromised, begins to come off before then, or soaks through with drainage call the office immediately.  You may have sutures under the skin that dissolve on their own over time.    As the sutures absorb occasionally a small suture abscess can develop, this is not uncommon for up to 6 weeks, if this occurs please notify your surgeon immediately.    HYGIENE  You may shower 24 hours after your surgery, provided the Mepilex silver dressing is in place.  No tub bathing or submerging underwater.  Do not scrub directly over the surgical bandage  Do not use any creams, lotions or ointments on the surgical leg for 4 weeks after surgery, or until you have been cleared to do so by your surgeon    GENERAL INSTRUCTIONS  Do not drink alcoholic beverages (beer and wine included) for 24 hours following your surgery, or while you are taking narcotic pain medications  Delay making important decisions until you are fully recovered  You cannot swim or submerge in water for at least 6 weeks after surgery, or until you are cleared by your surgeon.  You may start kneeling 3 months after knee replacement surgery, once you have been cleared by your surgeon.  This may not ever feel “normal” or comfortable    HOME DIET  Resume your normal diet after surgery. If you are on a specific type of diet for your condition, resume that instead.    Choose foods that help promote good bowel habits and prevent constipation, such as foods high in fiber.    POSTOPERATIVE MEDICATIONS  Pain medications have been ordered to help manage pain throughout recovery.  While you are using narcotic pain medication, you should be using a stool softener or laxative to prevent constipation.  My preference is parallax powder once or twice a day when taking the narcotic pain medicine  It is important to eat a small meal or snack before taking  "pain medications to avoid nausea or stomach upset.    MEDICATION REFILLS - [phone]  If you need to request a medication refill, please call the office between 8:30am-4:30pm, Monday through Friday.    Any calls received outside of this timeframe will be handled on the next business day.    Medication requests received on Saturday or Sunday will be handled on Monday.    Please allow 3-5 business days for all medication requests to be processed.    RESTARTING HOME MEDICATIONS  You may restart your home medications the following day after your surgery UNLESS you have been given alternate instructions.    Follow the instructions given to you on your hospital discharge instructions for more information regarding your home medications.    ASSISTIVE DEVICE & MOVEMENT  Initially, you will use a walker or crutches to walk for the first 2 weeks.  Once your therapist feels you are ready, you will wean to one crutch or cane followed by no assistive device.  It is important to keep moving throughout recovery.  Walk at least 10 steps every hour that you are awake.  Stairs are part of your recovery, the \"good \"leg leads on the way up, the \"bad \"leg leads on the way down to, use the handrails and not the walker or crutches.  You should be up and walking around several times per day as well as bending your knee and making sure your knee is going completely straight (for knee replacements).  For anterior hip replacements avoid straight leg raise.    NUMBNESS/CLICKING  Decreased sensation or numbness is common on or around the area of the incision due to sensory nerves that are affected at the time of surgery.  The area of numbness will be lateral to the incision  You might always have numbness but the size of the area of numbness should decrease with time.  It is common for patients to have a “click” in the knee with movement.  This is usually nothing to be concerned about.  There are several reasons why your knee can be making " these noises, including the implant components rubbing against each other, or a tendons going over a bony prominence.  Grinding can also occur and is not out of the ordinary.  Grinding can be caused by scar tissue formation  Noises will often settle over time once muscle strength improves.    DIFFICULTY SLEEPING  It is very common for patients to have difficulty sleeping at night which can be caused pain, medication, or feeling of anxiety.  Sleep disturbance typically worsens 4-6 weeks after surgery.  You are permitted to sleep on your back or side with a  pillow between your legs for comfort    DRIVING & TRAVEL  Your surgeon will address this at your post-op appointment.  You must not be taking narcotic pain medication to be cleared to drive.  LEFT LEG JOINT REPLACMENT:  You may drive once you have regained full control of your leg, typically around 2 week after surgery  RIGHT LEG JOINT REPLACEMENT:  You must speak with your surgeon before you resume driving.  Driving can typically be resumed by 4-6 weeks after surgery.  During long distance travel, you should attempt to change position or stand every hour.  You should complete ankle pumps throughout your travel if you are sitting for long periods of time.  If traveling within the first 2 weeks after surgery, you should wear your compression stockings.    DENTAL & OTHER PROCEDURES  All patients must wait a minimum of 3 months for elective procedures, including routine dental cleanings.  For any dental appointment - cleaning or dental procedures - patients must take a prophylactic antibiotic 1 hour before the appointment.  You will also need to call for an antibiotic prior to any other invasive test, procedure, or surgery.  These prophylactic antibiotics will be needed for the rest of your life, in order to prevent infections.  Please call your surgeons office at [phone] to request the antibiotic.      PHYSICAL THERAPY  Following surgery it is important to  progress through recovery with in-home or outpatient physical therapy.  You should continue to complete home exercises provided from the hospital on days that you are not working with a physical therapist.  It is common to have a temporary increase in pain and swelling upon starting outpatient physical therapy and/or changing your exercise routine.  Continue to use ice to help with symptoms.     FOLLOW-UP APPOINTMENT - [phone]  Your post-surgical appointments will take place approximately 2 weeks, 6 weeks, 3 months and 1 year following surgery.  Joint replacements are monitored thereafter every 2-5 years for life.  If you have any questions or need to make changes to this appointment, please contact the office:    EMERGENCIES & WHEN TO CALL YOUR SURGEON  When to contact our office immediately:  Any falls or injury to the joint replacement  Fever >101.5 for at least 48 hours after surgery or chills.  Excessive bleeding from incision(s). A small amount of drainage is normal and expected.  Signs of infection of incision(s)-excessive drainage that is soaking through your dressing (especially if it is pus-like), redness that is spreading out from the edges of your incision, or increased warmth around the area.  Excruciating pain for which the pain medication, taken as instructed, is not helping.  Severe calf pain.  Go directly to the emergency room or call 911, if you are experiencing chest pain or difficulty breathing.    ICE/COLD THERAPY  Ice is most important during the first 2 weeks after surgery, but should be used for several weeks as needed.  Never place ice, or cold therapy devices directly on the skin.  You should always have a protective layer between your skin and the cold.  You have been prescribed to ice your total joint at a minimum of twice per hour for 20 minutes while awake during the first 6 weeks after surgery if you are using ice packs. This will help with pain control.  If you are using an ice  machine, please follow ice machine instructions.  After knee replacement is extremely important to elevate the leg straight on an incline, not flat.    COLD THERAPY MACHINE RECOMMENDATIONS  Cold therapy devices can be used before and after surgery to assist in comfort and help to reduce pain and swelling.  These devices differ from ice or ice packs whereas the mechanism circulates water through tubing and a pad to provide longer periods of cold therapy to the desired site.  While in the hospital, you can use your cold devices around the clock for optimal comfort.  We recommend using cold therapy after working with therapy or completing exercises on your own.  Once you are discharged home, there is no set schedule in which you must follow while using cold therapy.  Below are a few points to remember when using a cold therapy device:    Read the 's instructions prior to first the use.  Follow instructions for filling the cooler (water first, then ice).  Always make sure there is a layer of protection between the cold pad and your skin (Clothing, Towel, Ace Bandage, etc.)  Allow the device to circulate cold water throughout the pad prior wrapping the pad around your leg (approximately 10 minutes).  Place the pad on your leg in the desired position to meet your pain management needs and use the wraps provided to secure the pad to your body.  The purpose of this device is to use consistently throughout the day.  You do not need to need to use the 20 on, 20 off method when using an ice machine.  During waking hours, remove the cold pad every 1-2 hours to perform a skin check  Detach the pad from the cooler and ambulate at least once every hour  After removing the pad, allow at least 30 minutes before resuming cold therapy  You may wear the cold therapy device during periods of sleep including overnight    If you wake up during the night, you can check the skin at this time.  You do not need to wake up  specifically to perform skin checks.  Empty the cooler and pad when device is not in use.  Follow 's instructions for cleaning your cold therapy device.    Critical access hospital can assist with problems related to products purchased through the [facility]  [phone] - [place]

## 2024-10-16 ENCOUNTER — APPOINTMENT (OUTPATIENT)
Dept: RADIOLOGY | Facility: HOSPITAL | Age: 69
End: 2024-10-16
Payer: COMMERCIAL

## 2024-10-16 ENCOUNTER — PHARMACY VISIT (OUTPATIENT)
Dept: PHARMACY | Facility: CLINIC | Age: 69
End: 2024-10-16
Payer: MEDICARE

## 2024-10-16 ENCOUNTER — ANESTHESIA (OUTPATIENT)
Dept: OPERATING ROOM | Facility: HOSPITAL | Age: 69
End: 2024-10-16
Payer: COMMERCIAL

## 2024-10-16 ENCOUNTER — ANESTHESIA EVENT (OUTPATIENT)
Dept: OPERATING ROOM | Facility: HOSPITAL | Age: 69
End: 2024-10-16
Payer: COMMERCIAL

## 2024-10-16 ENCOUNTER — HOSPITAL ENCOUNTER (OUTPATIENT)
Facility: HOSPITAL | Age: 69
Discharge: HOME | End: 2024-10-17
Attending: ORTHOPAEDIC SURGERY | Admitting: ORTHOPAEDIC SURGERY
Payer: COMMERCIAL

## 2024-10-16 DIAGNOSIS — M17.11 PRIMARY OSTEOARTHRITIS OF RIGHT KNEE: Primary | ICD-10-CM

## 2024-10-16 DIAGNOSIS — M62.838 MUSCLE SPASM: ICD-10-CM

## 2024-10-16 PROBLEM — T88.59XA DELAYED EMERGENCE FROM ANESTHESIA: Status: ACTIVE | Noted: 2024-10-16

## 2024-10-16 PROBLEM — I25.10 CAD (CORONARY ARTERY DISEASE): Status: ACTIVE | Noted: 2024-10-16

## 2024-10-16 PROBLEM — K21.9 GASTROESOPHAGEAL REFLUX DISEASE: Status: ACTIVE | Noted: 2024-10-16

## 2024-10-16 PROBLEM — I10 HTN (HYPERTENSION): Status: ACTIVE | Noted: 2024-10-16

## 2024-10-16 PROCEDURE — 2500000004 HC RX 250 GENERAL PHARMACY W/ HCPCS (ALT 636 FOR OP/ED): Performed by: INTERNAL MEDICINE

## 2024-10-16 PROCEDURE — 7100000011 HC EXTENDED STAY RECOVERY HOURLY - NURSING UNIT

## 2024-10-16 PROCEDURE — A4649 SURGICAL SUPPLIES: HCPCS | Performed by: ORTHOPAEDIC SURGERY

## 2024-10-16 PROCEDURE — 3600000018 HC OR TIME - INITIAL BASE CHARGE - PROCEDURE LEVEL SIX: Performed by: ORTHOPAEDIC SURGERY

## 2024-10-16 PROCEDURE — 73560 X-RAY EXAM OF KNEE 1 OR 2: CPT | Mod: RIGHT SIDE | Performed by: RADIOLOGY

## 2024-10-16 PROCEDURE — 94760 N-INVAS EAR/PLS OXIMETRY 1: CPT

## 2024-10-16 PROCEDURE — 2500000005 HC RX 250 GENERAL PHARMACY W/O HCPCS: Performed by: ORTHOPAEDIC SURGERY

## 2024-10-16 PROCEDURE — 2500000004 HC RX 250 GENERAL PHARMACY W/ HCPCS (ALT 636 FOR OP/ED): Performed by: EMERGENCY MEDICINE

## 2024-10-16 PROCEDURE — A6213 FOAM DRG >16<=48 SQ IN W/BDR: HCPCS | Performed by: ORTHOPAEDIC SURGERY

## 2024-10-16 PROCEDURE — 2500000001 HC RX 250 WO HCPCS SELF ADMINISTERED DRUGS (ALT 637 FOR MEDICARE OP): Performed by: EMERGENCY MEDICINE

## 2024-10-16 PROCEDURE — 2500000005 HC RX 250 GENERAL PHARMACY W/O HCPCS: Performed by: INTERNAL MEDICINE

## 2024-10-16 PROCEDURE — 2500000004 HC RX 250 GENERAL PHARMACY W/ HCPCS (ALT 636 FOR OP/ED)

## 2024-10-16 PROCEDURE — C1776 JOINT DEVICE (IMPLANTABLE): HCPCS | Performed by: ORTHOPAEDIC SURGERY

## 2024-10-16 PROCEDURE — 2500000001 HC RX 250 WO HCPCS SELF ADMINISTERED DRUGS (ALT 637 FOR MEDICARE OP): Performed by: STUDENT IN AN ORGANIZED HEALTH CARE EDUCATION/TRAINING PROGRAM

## 2024-10-16 PROCEDURE — 7100000001 HC RECOVERY ROOM TIME - INITIAL BASE CHARGE: Performed by: ORTHOPAEDIC SURGERY

## 2024-10-16 PROCEDURE — 2720000007 HC OR 272 NO HCPCS: Performed by: ORTHOPAEDIC SURGERY

## 2024-10-16 PROCEDURE — 2500000004 HC RX 250 GENERAL PHARMACY W/ HCPCS (ALT 636 FOR OP/ED): Performed by: ORTHOPAEDIC SURGERY

## 2024-10-16 PROCEDURE — 3700000001 HC GENERAL ANESTHESIA TIME - INITIAL BASE CHARGE: Performed by: ORTHOPAEDIC SURGERY

## 2024-10-16 PROCEDURE — 7100000002 HC RECOVERY ROOM TIME - EACH INCREMENTAL 1 MINUTE: Performed by: ORTHOPAEDIC SURGERY

## 2024-10-16 PROCEDURE — 2500000004 HC RX 250 GENERAL PHARMACY W/ HCPCS (ALT 636 FOR OP/ED): Performed by: STUDENT IN AN ORGANIZED HEALTH CARE EDUCATION/TRAINING PROGRAM

## 2024-10-16 PROCEDURE — RXMED WILLOW AMBULATORY MEDICATION CHARGE

## 2024-10-16 PROCEDURE — 2780000003 HC OR 278 NO HCPCS: Performed by: ORTHOPAEDIC SURGERY

## 2024-10-16 PROCEDURE — 3700000002 HC GENERAL ANESTHESIA TIME - EACH INCREMENTAL 1 MINUTE: Performed by: ORTHOPAEDIC SURGERY

## 2024-10-16 PROCEDURE — 2500000001 HC RX 250 WO HCPCS SELF ADMINISTERED DRUGS (ALT 637 FOR MEDICARE OP): Performed by: ORTHOPAEDIC SURGERY

## 2024-10-16 PROCEDURE — 73560 X-RAY EXAM OF KNEE 1 OR 2: CPT | Mod: RT

## 2024-10-16 PROCEDURE — 3600000017 HC OR TIME - EACH INCREMENTAL 1 MINUTE - PROCEDURE LEVEL SIX: Performed by: ORTHOPAEDIC SURGERY

## 2024-10-16 PROCEDURE — 99222 1ST HOSP IP/OBS MODERATE 55: CPT | Performed by: EMERGENCY MEDICINE

## 2024-10-16 DEVICE — TIBIAL COMPONENT
Type: IMPLANTABLE DEVICE | Site: KNEE | Status: FUNCTIONAL
Brand: TRIATHLON

## 2024-10-16 DEVICE — BONE PINS (3.2MM X 110MM): Type: IMPLANTABLE DEVICE | Site: KNEE | Status: NON-FUNCTIONAL

## 2024-10-16 DEVICE — CRUCIATE RETAINING FEMORAL
Type: IMPLANTABLE DEVICE | Site: KNEE | Status: FUNCTIONAL
Brand: TRIATHLON

## 2024-10-16 RX ORDER — FUROSEMIDE 20 MG/1
10 TABLET ORAL DAILY
Status: DISCONTINUED | OUTPATIENT
Start: 2024-10-17 | End: 2024-10-17 | Stop reason: HOSPADM

## 2024-10-16 RX ORDER — HYDROMORPHONE HYDROCHLORIDE 0.2 MG/ML
0.2 INJECTION INTRAMUSCULAR; INTRAVENOUS; SUBCUTANEOUS EVERY 5 MIN PRN
Status: DISCONTINUED | OUTPATIENT
Start: 2024-10-16 | End: 2024-10-16 | Stop reason: HOSPADM

## 2024-10-16 RX ORDER — OXYCODONE HYDROCHLORIDE 5 MG/1
10 TABLET ORAL 3 TIMES DAILY
Status: DISCONTINUED | OUTPATIENT
Start: 2024-10-16 | End: 2024-10-16

## 2024-10-16 RX ORDER — ONDANSETRON 4 MG/1
4 TABLET, ORALLY DISINTEGRATING ORAL EVERY 8 HOURS PRN
Status: DISCONTINUED | OUTPATIENT
Start: 2024-10-16 | End: 2024-10-17 | Stop reason: HOSPADM

## 2024-10-16 RX ORDER — LABETALOL HYDROCHLORIDE 5 MG/ML
5 INJECTION, SOLUTION INTRAVENOUS EVERY 5 MIN PRN
Status: DISCONTINUED | OUTPATIENT
Start: 2024-10-16 | End: 2024-10-16 | Stop reason: HOSPADM

## 2024-10-16 RX ORDER — ORPHENADRINE CITRATE 30 MG/ML
60 INJECTION INTRAMUSCULAR; INTRAVENOUS EVERY 12 HOURS SCHEDULED
Status: DISCONTINUED | OUTPATIENT
Start: 2024-10-16 | End: 2024-10-17 | Stop reason: HOSPADM

## 2024-10-16 RX ORDER — OXYCODONE HYDROCHLORIDE 5 MG/1
10 TABLET ORAL EVERY 8 HOURS PRN
Status: DISCONTINUED | OUTPATIENT
Start: 2024-10-16 | End: 2024-10-17 | Stop reason: HOSPADM

## 2024-10-16 RX ORDER — WATER
100 LIQUID (ML) MISCELLANEOUS
Status: DISCONTINUED | OUTPATIENT
Start: 2024-10-16 | End: 2024-10-17 | Stop reason: HOSPADM

## 2024-10-16 RX ORDER — CALC/MAG/B COMPLEX/D3/HERB 61
15 TABLET ORAL
Status: DISCONTINUED | OUTPATIENT
Start: 2024-10-17 | End: 2024-10-16 | Stop reason: CLARIF

## 2024-10-16 RX ORDER — FAMOTIDINE 20 MG/1
20 TABLET, FILM COATED ORAL ONCE
Status: COMPLETED | OUTPATIENT
Start: 2024-10-16 | End: 2024-10-16

## 2024-10-16 RX ORDER — DOCUSATE SODIUM 100 MG/1
100 CAPSULE, LIQUID FILLED ORAL 2 TIMES DAILY PRN
Status: DISCONTINUED | OUTPATIENT
Start: 2024-10-16 | End: 2024-10-17 | Stop reason: HOSPADM

## 2024-10-16 RX ORDER — MIDAZOLAM HYDROCHLORIDE 1 MG/ML
INJECTION, SOLUTION INTRAMUSCULAR; INTRAVENOUS AS NEEDED
Status: DISCONTINUED | OUTPATIENT
Start: 2024-10-16 | End: 2024-10-16

## 2024-10-16 RX ORDER — VANCOMYCIN 1.5 G/300ML
INJECTION, SOLUTION INTRAVENOUS AS NEEDED
Status: DISCONTINUED | OUTPATIENT
Start: 2024-10-16 | End: 2024-10-16

## 2024-10-16 RX ORDER — OXYCODONE AND ACETAMINOPHEN 5; 325 MG/1; MG/1
2 TABLET ORAL EVERY 6 HOURS PRN
Status: DISCONTINUED | OUTPATIENT
Start: 2024-10-16 | End: 2024-10-16

## 2024-10-16 RX ORDER — ASPIRIN 81 MG/1
81 TABLET ORAL 2 TIMES DAILY
COMMUNITY
Start: 2024-10-16 | End: 2024-10-30

## 2024-10-16 RX ORDER — VANCOMYCIN 1.5 G/300ML
1.5 INJECTION, SOLUTION INTRAVENOUS EVERY 12 HOURS
Status: COMPLETED | OUTPATIENT
Start: 2024-10-16 | End: 2024-10-16

## 2024-10-16 RX ORDER — ROPIVACAINE/EPI/CLONIDINE/KET 2.46-0.005
SYRINGE (ML) INJECTION AS NEEDED
Status: DISCONTINUED | OUTPATIENT
Start: 2024-10-16 | End: 2024-10-16 | Stop reason: HOSPADM

## 2024-10-16 RX ORDER — PROPOFOL 10 MG/ML
INJECTION, EMULSION INTRAVENOUS AS NEEDED
Status: DISCONTINUED | OUTPATIENT
Start: 2024-10-16 | End: 2024-10-16

## 2024-10-16 RX ORDER — ASPIRIN 81 MG/1
81 TABLET ORAL 2 TIMES DAILY
Status: DISCONTINUED | OUTPATIENT
Start: 2024-10-16 | End: 2024-10-17 | Stop reason: HOSPADM

## 2024-10-16 RX ORDER — KETOROLAC TROMETHAMINE 15 MG/ML
15 INJECTION, SOLUTION INTRAMUSCULAR; INTRAVENOUS EVERY 6 HOURS
Status: COMPLETED | OUTPATIENT
Start: 2024-10-16 | End: 2024-10-17

## 2024-10-16 RX ORDER — SODIUM CHLORIDE, SODIUM LACTATE, POTASSIUM CHLORIDE, CALCIUM CHLORIDE 600; 310; 30; 20 MG/100ML; MG/100ML; MG/100ML; MG/100ML
40 INJECTION, SOLUTION INTRAVENOUS CONTINUOUS
Status: DISCONTINUED | OUTPATIENT
Start: 2024-10-16 | End: 2024-10-16 | Stop reason: HOSPADM

## 2024-10-16 RX ORDER — DIPHENHYDRAMINE HYDROCHLORIDE 50 MG/ML
12.5 INJECTION INTRAMUSCULAR; INTRAVENOUS EVERY 6 HOURS PRN
Status: DISCONTINUED | OUTPATIENT
Start: 2024-10-16 | End: 2024-10-17 | Stop reason: HOSPADM

## 2024-10-16 RX ORDER — ACETAMINOPHEN 325 MG/1
650 TABLET ORAL EVERY 6 HOURS PRN
Status: DISCONTINUED | OUTPATIENT
Start: 2024-10-16 | End: 2024-10-17 | Stop reason: HOSPADM

## 2024-10-16 RX ORDER — SIMETHICONE 80 MG
80 TABLET,CHEWABLE ORAL 4 TIMES DAILY PRN
Status: DISCONTINUED | OUTPATIENT
Start: 2024-10-16 | End: 2024-10-17 | Stop reason: HOSPADM

## 2024-10-16 RX ORDER — NAPROXEN 250 MG/1
375 TABLET ORAL EVERY 6 HOURS
Status: DISCONTINUED | OUTPATIENT
Start: 2024-10-16 | End: 2024-10-16

## 2024-10-16 RX ORDER — BACLOFEN 10 MG/1
5 TABLET ORAL 3 TIMES DAILY
Status: DISCONTINUED | OUTPATIENT
Start: 2024-10-16 | End: 2024-10-17 | Stop reason: HOSPADM

## 2024-10-16 RX ORDER — ONDANSETRON HYDROCHLORIDE 2 MG/ML
4 INJECTION, SOLUTION INTRAVENOUS ONCE AS NEEDED
Status: DISCONTINUED | OUTPATIENT
Start: 2024-10-16 | End: 2024-10-16 | Stop reason: HOSPADM

## 2024-10-16 RX ORDER — FENTANYL CITRATE 50 UG/ML
50 INJECTION, SOLUTION INTRAMUSCULAR; INTRAVENOUS ONCE
Status: COMPLETED | OUTPATIENT
Start: 2024-10-16 | End: 2024-10-16

## 2024-10-16 RX ORDER — ONDANSETRON HYDROCHLORIDE 2 MG/ML
4 INJECTION, SOLUTION INTRAVENOUS EVERY 8 HOURS PRN
Status: DISCONTINUED | OUTPATIENT
Start: 2024-10-16 | End: 2024-10-17 | Stop reason: HOSPADM

## 2024-10-16 RX ORDER — MIDAZOLAM HYDROCHLORIDE 1 MG/ML
2 INJECTION, SOLUTION INTRAMUSCULAR; INTRAVENOUS ONCE
Status: COMPLETED | OUTPATIENT
Start: 2024-10-16 | End: 2024-10-16

## 2024-10-16 RX ORDER — TRANEXAMIC ACID 100 MG/ML
INJECTION, SOLUTION INTRAVENOUS AS NEEDED
Status: DISCONTINUED | OUTPATIENT
Start: 2024-10-16 | End: 2024-10-16

## 2024-10-16 RX ORDER — FENTANYL CITRATE 50 UG/ML
50 INJECTION, SOLUTION INTRAMUSCULAR; INTRAVENOUS EVERY 5 MIN PRN
Status: DISCONTINUED | OUTPATIENT
Start: 2024-10-16 | End: 2024-10-16 | Stop reason: HOSPADM

## 2024-10-16 RX ORDER — IPRATROPIUM BROMIDE 0.5 MG/2.5ML
500 SOLUTION RESPIRATORY (INHALATION) EVERY 30 MIN PRN
Status: DISCONTINUED | OUTPATIENT
Start: 2024-10-16 | End: 2024-10-16 | Stop reason: HOSPADM

## 2024-10-16 RX ORDER — PANTOPRAZOLE SODIUM 20 MG/1
20 TABLET, DELAYED RELEASE ORAL
Status: DISCONTINUED | OUTPATIENT
Start: 2024-10-17 | End: 2024-10-17 | Stop reason: HOSPADM

## 2024-10-16 RX ORDER — ALBUTEROL SULFATE 0.83 MG/ML
2.5 SOLUTION RESPIRATORY (INHALATION) EVERY 30 MIN PRN
Status: DISCONTINUED | OUTPATIENT
Start: 2024-10-16 | End: 2024-10-16 | Stop reason: HOSPADM

## 2024-10-16 RX ORDER — LISINOPRIL 10 MG/1
10 TABLET ORAL DAILY
Status: DISCONTINUED | OUTPATIENT
Start: 2024-10-17 | End: 2024-10-17 | Stop reason: HOSPADM

## 2024-10-16 RX ORDER — OXYCODONE AND ACETAMINOPHEN 5; 325 MG/1; MG/1
1 TABLET ORAL EVERY 6 HOURS PRN
Status: DISCONTINUED | OUTPATIENT
Start: 2024-10-16 | End: 2024-10-16

## 2024-10-16 RX ORDER — MEPERIDINE HYDROCHLORIDE 25 MG/ML
12.5 INJECTION INTRAMUSCULAR; INTRAVENOUS; SUBCUTANEOUS EVERY 10 MIN PRN
Status: DISCONTINUED | OUTPATIENT
Start: 2024-10-16 | End: 2024-10-16 | Stop reason: HOSPADM

## 2024-10-16 RX ORDER — POLYETHYLENE GLYCOL 3350 17 G/17G
17 POWDER, FOR SOLUTION ORAL DAILY
Status: DISCONTINUED | OUTPATIENT
Start: 2024-10-17 | End: 2024-10-17 | Stop reason: HOSPADM

## 2024-10-16 RX ORDER — OXYCODONE AND ACETAMINOPHEN 5; 325 MG/1; MG/1
1 TABLET ORAL EVERY 6 HOURS
Status: DISCONTINUED | OUTPATIENT
Start: 2024-10-16 | End: 2024-10-17 | Stop reason: HOSPADM

## 2024-10-16 RX ORDER — ONDANSETRON HYDROCHLORIDE 2 MG/ML
INJECTION, SOLUTION INTRAVENOUS AS NEEDED
Status: DISCONTINUED | OUTPATIENT
Start: 2024-10-16 | End: 2024-10-16

## 2024-10-16 RX ORDER — TALC
3 POWDER (GRAM) TOPICAL NIGHTLY PRN
Status: DISCONTINUED | OUTPATIENT
Start: 2024-10-16 | End: 2024-10-17 | Stop reason: HOSPADM

## 2024-10-16 RX ORDER — OXYCODONE AND ACETAMINOPHEN 5; 325 MG/1; MG/1
1 TABLET ORAL EVERY 4 HOURS PRN
Status: DISCONTINUED | OUTPATIENT
Start: 2024-10-16 | End: 2024-10-16

## 2024-10-16 RX ORDER — POLYETHYLENE GLYCOL 3350 17 G/17G
17 POWDER, FOR SOLUTION ORAL DAILY
COMMUNITY
Start: 2024-10-16

## 2024-10-16 RX ORDER — VANCOMYCIN 1.5 G/300ML
1500 INJECTION, SOLUTION INTRAVENOUS ONCE
Status: COMPLETED | OUTPATIENT
Start: 2024-10-16 | End: 2024-10-16

## 2024-10-16 RX ORDER — METOCLOPRAMIDE 10 MG/1
10 TABLET ORAL EVERY 6 HOURS PRN
Status: DISCONTINUED | OUTPATIENT
Start: 2024-10-16 | End: 2024-10-16 | Stop reason: HOSPADM

## 2024-10-16 RX ORDER — SODIUM CHLORIDE, SODIUM LACTATE, POTASSIUM CHLORIDE, CALCIUM CHLORIDE 600; 310; 30; 20 MG/100ML; MG/100ML; MG/100ML; MG/100ML
20 INJECTION, SOLUTION INTRAVENOUS CONTINUOUS
Status: DISCONTINUED | OUTPATIENT
Start: 2024-10-16 | End: 2024-10-17 | Stop reason: HOSPADM

## 2024-10-16 SDOH — HEALTH STABILITY: MENTAL HEALTH: HOW OFTEN DO YOU HAVE A DRINK CONTAINING ALCOHOL?: NEVER

## 2024-10-16 SDOH — ECONOMIC STABILITY: FOOD INSECURITY: WITHIN THE PAST 12 MONTHS, THE FOOD YOU BOUGHT JUST DIDN'T LAST AND YOU DIDN'T HAVE MONEY TO GET MORE.: NEVER TRUE

## 2024-10-16 SDOH — ECONOMIC STABILITY: FOOD INSECURITY: WITHIN THE PAST 12 MONTHS, YOU WORRIED THAT YOUR FOOD WOULD RUN OUT BEFORE YOU GOT THE MONEY TO BUY MORE.: NEVER TRUE

## 2024-10-16 SDOH — HEALTH STABILITY: MENTAL HEALTH: HOW OFTEN DO YOU HAVE SIX OR MORE DRINKS ON ONE OCCASION?: NEVER

## 2024-10-16 SDOH — ECONOMIC STABILITY: HOUSING INSECURITY: AT ANY TIME IN THE PAST 12 MONTHS, WERE YOU HOMELESS OR LIVING IN A SHELTER (INCLUDING NOW)?: NO

## 2024-10-16 SDOH — SOCIAL STABILITY: SOCIAL INSECURITY: DO YOU FEEL UNSAFE GOING BACK TO THE PLACE WHERE YOU ARE LIVING?: NO

## 2024-10-16 SDOH — SOCIAL STABILITY: SOCIAL INSECURITY: WITHIN THE LAST YEAR, HAVE YOU BEEN AFRAID OF YOUR PARTNER OR EX-PARTNER?: NO

## 2024-10-16 SDOH — SOCIAL STABILITY: SOCIAL INSECURITY: DO YOU FEEL ANYONE HAS EXPLOITED OR TAKEN ADVANTAGE OF YOU FINANCIALLY OR OF YOUR PERSONAL PROPERTY?: NO

## 2024-10-16 SDOH — SOCIAL STABILITY: SOCIAL INSECURITY: ARE THERE ANY APPARENT SIGNS OF INJURIES/BEHAVIORS THAT COULD BE RELATED TO ABUSE/NEGLECT?: NO

## 2024-10-16 SDOH — SOCIAL STABILITY: SOCIAL INSECURITY: HAS ANYONE EVER THREATENED TO HURT YOUR FAMILY OR YOUR PETS?: NO

## 2024-10-16 SDOH — ECONOMIC STABILITY: HOUSING INSECURITY: IN THE PAST 12 MONTHS, HOW MANY TIMES HAVE YOU MOVED WHERE YOU WERE LIVING?: 0

## 2024-10-16 SDOH — SOCIAL STABILITY: SOCIAL INSECURITY: WITHIN THE LAST YEAR, HAVE YOU BEEN HUMILIATED OR EMOTIONALLY ABUSED IN OTHER WAYS BY YOUR PARTNER OR EX-PARTNER?: NO

## 2024-10-16 SDOH — ECONOMIC STABILITY: FOOD INSECURITY: HOW HARD IS IT FOR YOU TO PAY FOR THE VERY BASICS LIKE FOOD, HOUSING, MEDICAL CARE, AND HEATING?: NOT HARD AT ALL

## 2024-10-16 SDOH — SOCIAL STABILITY: SOCIAL INSECURITY: WERE YOU ABLE TO COMPLETE ALL THE BEHAVIORAL HEALTH SCREENINGS?: YES

## 2024-10-16 SDOH — ECONOMIC STABILITY: HOUSING INSECURITY: IN THE LAST 12 MONTHS, WAS THERE A TIME WHEN YOU WERE NOT ABLE TO PAY THE MORTGAGE OR RENT ON TIME?: NO

## 2024-10-16 SDOH — SOCIAL STABILITY: SOCIAL INSECURITY: ABUSE: ADULT

## 2024-10-16 SDOH — SOCIAL STABILITY: SOCIAL INSECURITY: DOES ANYONE TRY TO KEEP YOU FROM HAVING/CONTACTING OTHER FRIENDS OR DOING THINGS OUTSIDE YOUR HOME?: NO

## 2024-10-16 SDOH — SOCIAL STABILITY: SOCIAL INSECURITY: ARE YOU OR HAVE YOU BEEN THREATENED OR ABUSED PHYSICALLY, EMOTIONALLY, OR SEXUALLY BY ANYONE?: NO

## 2024-10-16 SDOH — SOCIAL STABILITY: SOCIAL INSECURITY
WITHIN THE LAST YEAR, HAVE YOU BEEN RAPED OR FORCED TO HAVE ANY KIND OF SEXUAL ACTIVITY BY YOUR PARTNER OR EX-PARTNER?: NO

## 2024-10-16 SDOH — SOCIAL STABILITY: SOCIAL INSECURITY: HAVE YOU HAD ANY THOUGHTS OF HARMING ANYONE ELSE?: NO

## 2024-10-16 SDOH — ECONOMIC STABILITY: TRANSPORTATION INSECURITY: IN THE PAST 12 MONTHS, HAS LACK OF TRANSPORTATION KEPT YOU FROM MEDICAL APPOINTMENTS OR FROM GETTING MEDICATIONS?: NO

## 2024-10-16 SDOH — SOCIAL STABILITY: SOCIAL INSECURITY: HAVE YOU HAD THOUGHTS OF HARMING ANYONE ELSE?: NO

## 2024-10-16 SDOH — ECONOMIC STABILITY: INCOME INSECURITY: IN THE PAST 12 MONTHS HAS THE ELECTRIC, GAS, OIL, OR WATER COMPANY THREATENED TO SHUT OFF SERVICES IN YOUR HOME?: NO

## 2024-10-16 SDOH — HEALTH STABILITY: MENTAL HEALTH: CURRENT SMOKER: 0

## 2024-10-16 SDOH — HEALTH STABILITY: MENTAL HEALTH: HOW MANY DRINKS CONTAINING ALCOHOL DO YOU HAVE ON A TYPICAL DAY WHEN YOU ARE DRINKING?: PATIENT DOES NOT DRINK

## 2024-10-16 ASSESSMENT — ACTIVITIES OF DAILY LIVING (ADL)
LACK_OF_TRANSPORTATION: NO
WALKS IN HOME: INDEPENDENT
GROOMING: INDEPENDENT
LACK_OF_TRANSPORTATION: NO
HEARING - RIGHT EAR: FUNCTIONAL
ASSISTIVE_DEVICE: WALKER
FEEDING YOURSELF: INDEPENDENT
BATHING: INDEPENDENT
HEARING - LEFT EAR: FUNCTIONAL
PATIENT'S MEMORY ADEQUATE TO SAFELY COMPLETE DAILY ACTIVITIES?: YES
JUDGMENT_ADEQUATE_SAFELY_COMPLETE_DAILY_ACTIVITIES: YES
DRESSING YOURSELF: INDEPENDENT
ADEQUATE_TO_COMPLETE_ADL: YES
TOILETING: INDEPENDENT

## 2024-10-16 ASSESSMENT — PAIN SCALES - GENERAL
PAINLEVEL_OUTOF10: 3
PAINLEVEL_OUTOF10: 10 - WORST POSSIBLE PAIN
PAINLEVEL_OUTOF10: 0 - NO PAIN
PAINLEVEL_OUTOF10: 3
PAINLEVEL_OUTOF10: 0 - NO PAIN
PAINLEVEL_OUTOF10: 8
PAINLEVEL_OUTOF10: 6
PAINLEVEL_OUTOF10: 8
PAINLEVEL_OUTOF10: 0 - NO PAIN
PAINLEVEL_OUTOF10: 7
PAINLEVEL_OUTOF10: 0 - NO PAIN
PAINLEVEL_OUTOF10: 10 - WORST POSSIBLE PAIN
PAINLEVEL_OUTOF10: 10 - WORST POSSIBLE PAIN
PAINLEVEL_OUTOF10: 0 - NO PAIN
PAINLEVEL_OUTOF10: 4
PAINLEVEL_OUTOF10: 4
PAINLEVEL_OUTOF10: 10 - WORST POSSIBLE PAIN

## 2024-10-16 ASSESSMENT — COGNITIVE AND FUNCTIONAL STATUS - GENERAL
HELP NEEDED FOR BATHING: A LITTLE
WALKING IN HOSPITAL ROOM: A LITTLE
TURNING FROM BACK TO SIDE WHILE IN FLAT BAD: A LITTLE
MOVING TO AND FROM BED TO CHAIR: A LITTLE
DRESSING REGULAR LOWER BODY CLOTHING: A LITTLE
TURNING FROM BACK TO SIDE WHILE IN FLAT BAD: A LITTLE
CLIMB 3 TO 5 STEPS WITH RAILING: A LITTLE
CLIMB 3 TO 5 STEPS WITH RAILING: A LITTLE
HELP NEEDED FOR BATHING: A LITTLE
MOBILITY SCORE: 19
WALKING IN HOSPITAL ROOM: A LITTLE
STANDING UP FROM CHAIR USING ARMS: A LITTLE
DAILY ACTIVITIY SCORE: 21
TOILETING: A LITTLE
TOILETING: A LITTLE
STANDING UP FROM CHAIR USING ARMS: A LITTLE
MOBILITY SCORE: 19
PATIENT BASELINE BEDBOUND: NO
DRESSING REGULAR LOWER BODY CLOTHING: A LITTLE
DAILY ACTIVITIY SCORE: 21
MOVING TO AND FROM BED TO CHAIR: A LITTLE

## 2024-10-16 ASSESSMENT — PAIN DESCRIPTION - DESCRIPTORS
DESCRIPTORS: ACHING

## 2024-10-16 ASSESSMENT — PAIN DESCRIPTION - LOCATION
LOCATION: KNEE

## 2024-10-16 ASSESSMENT — COLUMBIA-SUICIDE SEVERITY RATING SCALE - C-SSRS
6. HAVE YOU EVER DONE ANYTHING, STARTED TO DO ANYTHING, OR PREPARED TO DO ANYTHING TO END YOUR LIFE?: NO
1. IN THE PAST MONTH, HAVE YOU WISHED YOU WERE DEAD OR WISHED YOU COULD GO TO SLEEP AND NOT WAKE UP?: NO
2. HAVE YOU ACTUALLY HAD ANY THOUGHTS OF KILLING YOURSELF?: NO

## 2024-10-16 ASSESSMENT — LIFESTYLE VARIABLES
AUDIT-C TOTAL SCORE: 0
HOW OFTEN DO YOU HAVE A DRINK CONTAINING ALCOHOL: NEVER
AUDIT-C TOTAL SCORE: 0
HOW OFTEN DO YOU HAVE 6 OR MORE DRINKS ON ONE OCCASION: NEVER
HOW MANY STANDARD DRINKS CONTAINING ALCOHOL DO YOU HAVE ON A TYPICAL DAY: PATIENT DOES NOT DRINK
SKIP TO QUESTIONS 9-10: 1
AUDIT-C TOTAL SCORE: 0
SKIP TO QUESTIONS 9-10: 1

## 2024-10-16 ASSESSMENT — PAIN DESCRIPTION - ORIENTATION
ORIENTATION: RIGHT

## 2024-10-16 ASSESSMENT — PAIN - FUNCTIONAL ASSESSMENT
PAIN_FUNCTIONAL_ASSESSMENT: 0-10

## 2024-10-16 ASSESSMENT — PATIENT HEALTH QUESTIONNAIRE - PHQ9
2. FEELING DOWN, DEPRESSED OR HOPELESS: NOT AT ALL
SUM OF ALL RESPONSES TO PHQ9 QUESTIONS 1 & 2: 0
1. LITTLE INTEREST OR PLEASURE IN DOING THINGS: NOT AT ALL

## 2024-10-16 NOTE — ANESTHESIA PROCEDURE NOTES
Peripheral Block    Patient location during procedure: pre-op  Start time: 10/16/2024 9:12 AM  End time: 10/16/2024 9:13 AM  Reason for block: at surgeon's request and post-op pain management  Staffing  Performed: attending   Authorized by: Sharan Henry DO    Performed by: Sharan Henry DO  Preanesthetic Checklist  Completed: patient identified, IV checked, site marked, risks and benefits discussed, surgical consent, monitors and equipment checked, pre-op evaluation and timeout performed   Timeout performed at: 10/16/2024 9:05 AM  Peripheral Block  Patient position: laying flat  Prep: ChloraPrep  Patient monitoring: heart rate, cardiac monitor and continuous pulse ox  Block type: other (IPACK)  Laterality: right  Injection technique: single-shot  Guidance: ultrasound guided  Local infiltration: ropivacaine  Infiltration strength: 0.5 %  Dose: 10 mL  Needle  Needle gauge: 22 G  Needle length: 10 cm  Needle localization: ultrasound guidance     image stored in chart  Assessment  Injection assessment: negative aspiration for heme, no paresthesia on injection and incremental injection  Paresthesia pain: none  Heart rate change: no  Slow fractionated injection: yes

## 2024-10-16 NOTE — PROGRESS NOTES
TCC met with patient at the bedside to discuss discharge plan.  69 yr old male admitted extended recovery following  right knee replacement with Dr. Collado.  Plan is home tomorrow with OPT 10/21/24 at 1:20-[employer]  Post op follow up on 10/31/24 at 1:30 pm-Jayesh  Spouse to transport home and assist with care as needed.    10/16/24 1330   Discharge Planning   Living Arrangements Spouse/significant other   Support Systems Spouse/significant other   Assistance Needed transportation   Type of Residence Private residence   Number of Stairs to Enter Residence 4   Number of Stairs Within Residence 8   Do you have animals or pets at home? Yes   Type of Animals or Pets 1 cat, 1 dog   Who is requesting discharge planning? Provider   Home or Post Acute Services Other (Comment)  (OPT)   Expected Discharge Disposition Home  (Outpatient Therapy)   Does the patient need discharge transport arranged? No   Financial Resource Strain   How hard is it for you to pay for the very basics like food, housing, medical care, and heating? Not hard   Housing Stability   In the last 12 months, was there a time when you were not able to pay the mortgage or rent on time? N   In the past 12 months, how many times have you moved where you were living? 0   At any time in the past 12 months, were you homeless or living in a shelter (including now)? N   Transportation Needs   In the past 12 months, has lack of transportation kept you from medical appointments or from getting medications? no   In the past 12 months, has lack of transportation kept you from meetings, work, or from getting things needed for daily living? No   Patient Choice   Provider Choice list and CMS website (https://medicare.gov/care-compare#search) for post-acute Quality and Resource Measure Data were provided and reviewed with: Patient

## 2024-10-16 NOTE — ANESTHESIA POSTPROCEDURE EVALUATION
Patient: Alvaro Christensen    Procedure Summary       Date: 10/16/24 Room / Location: [facility] / Virtual [facility]    Anesthesia Start: 0944 Anesthesia Stop: 1149    Procedure: OPEN TOTAL KNEE ARTHROPLASTY ( BIB ROBOTIC ASSISTED DEVICE, MACK TRIATHLON ) ** Rapid Recovery ** (Right: Knee) Diagnosis:       Primary osteoarthritis of right knee      (Primary osteoarthritis of right knee [M17.11])    Surgeons: Kalpesh Collado DO Responsible Provider: Sharan Henry DO    Anesthesia Type: regional, spinal ASA Status: 3            Anesthesia Type: regional, spinal    Vitals Value Taken Time   /66 10/16/24 1200   Temp 36 10/16/24 1200   Pulse 60 10/16/24 1200   Resp 16 10/16/24 1200   SpO2 94 10/16/24 1200       Anesthesia Post Evaluation    Patient location during evaluation: bedside  Patient participation: complete - patient participated  Level of consciousness: awake and alert  Pain management: adequate  Multimodal analgesia pain management approach  Airway patency: patent  Two or more strategies used to mitigate risk of obstructive sleep apnea  Cardiovascular status: acceptable  Respiratory status: acceptable  Hydration status: acceptable  Postoperative Nausea and Vomiting: none        There were no known notable events for this encounter.

## 2024-10-16 NOTE — ANESTHESIA PREPROCEDURE EVALUATION
Patient: Alvaro Christensen    Procedure Information       Date/Time: 10/16/24 0935    Procedure: OPEN TOTAL KNEE ARTHROPLASTY ( BBI ROBOTIC ASSISTED DEVICE, MACK TRIATHLON ) ** Rapid Recovery ** (Right: Knee)    Location: [facility] / [facility]    Surgeons: Kalpesh Collado DO          Past Medical History:   Diagnosis Date    Chronic lower back pain     Delayed emergence from general anesthesia     GERD (gastroesophageal reflux disease)     Hearing loss     History of peripheral edema     Hypertension     OA (osteoarthritis)      Past Surgical History:   Procedure Laterality Date    CARDIAC CATHETERIZATION N/A 04/24/2024    Procedure: Left Heart Cath;  Surgeon: Camilo Treviño MD;  Location: [facility] Cardiac Cath Lab;  Service: Cardiovascular;  Laterality: N/A;    OTHER SURGICAL HISTORY  11/02/2020    Lumbar Back surgery    OTHER SURGICAL HISTORY Left 11/02/2020    Left [place] Hip resurfacing       Relevant Problems   Anesthesia   (+) Delayed emergence from anesthesia      Cardiac   (+) CAD (coronary artery disease)   (+) HTN (hypertension)      GI   (+) Gastroesophageal reflux disease       Clinical information reviewed:   Tobacco  Allergies  Meds   Med Hx  Surg Hx   Fam Hx  Soc Hx        NPO Detail:  NPO/Void Status  Date of Last Liquid: 10/15/24  Time of Last Liquid: 2000  Date of Last Solid: 10/15/24  Time of Last Solid: 2000  Time of Last Void: 0630         Physical Exam    Airway  Mallampati: II  TM distance: >3 FB  Neck ROM: full     Cardiovascular   Comments: deferred   Dental   Comments: No loose teeth   Pulmonary   Comments: deferred   Abdominal     Comments: deferred           Anesthesia Plan    History of general anesthesia?: yes  History of complications of general anesthesia?: no    ASA 3     regional and spinal     The patient is not a current smoker.  Patient was not previously instructed to abstain from smoking on day of procedure.  Patient did not smoke on day of  procedure.  Education provided regarding risk of obstructive sleep apnea.  intravenous induction   Postoperative administration of opioids is intended.  Anesthetic plan and risks discussed with patient.  Use of blood products discussed with patient who consented to blood products.    Plan discussed with CRNA and CAA.

## 2024-10-16 NOTE — H&P
History Of Present Illness  Alvaro Christensen is a 69 y.o. male status post right total knee arthroplasty secondary to unilateral osteoarthritis. He is clinically stable and complains out of 10/10 pain in his right knee. He denies numbness, chest pain, shortness of breath, and GI upset.      Past Medical History  Past Medical History:   Diagnosis Date    Chronic lower back pain     Delayed emergence from general anesthesia     GERD (gastroesophageal reflux disease)     Hearing loss     History of peripheral edema     Hypertension     OA (osteoarthritis)          Surgical History  Past Surgical History:   Procedure Laterality Date    CARDIAC CATHETERIZATION N/A 04/24/2024    Procedure: Left Heart Cath;  Surgeon: Camilo Treviño MD;  Location: [facility] Card[facility];  Service: Cardiovascular;  Laterality: N/A;    OTHER SURGICAL HISTORY  11/02/2020    Lumbar Back surgery    OTHER SURGICAL HISTORY Left 11/02/2020    Left Radha Hip resurfacing          Social History  He reports that he quit smoking about 10 years ago. His smoking use included cigarettes. He started smoking about 42 years ago. He has a 64.2 pack-year smoking history. He has been exposed to tobacco smoke. He has never used smokeless tobacco. He reports that he does not drink alcohol and does not use drugs.    Family History  No family history on file.     Allergies  Penicillins and Sulfa (sulfonamide antibiotics)    Review of Systems  Constitutional: Negative for fever, chills, and fatigue  Eyes: Negative for vision changes, pain, and discharge  ENT: Negative for hearing loss, congestion, and sore throat  Cardiovascular: Negative for chest pain, palpitations, and shortness of breath   Respiratory: Negative for cough, wheezing, and difficulty breathing  Gastrointestinal: Negative for nausea, vomiting, diarrhea, and constipation  Genitourinary: Negative for dysuria, frequency, and urgency   Musculoskeletal: Positive for right knee pain.  Negative for swelling and tenderness   Neurologic: Negative for headache, dizziness, and numbness  Psychiatric: Negative for anxiety, depression, and confusion  Skin: Negative for rashes, lesions, and itching  Endocrine: Negative for polydipsia, polyuria, and intolerance to heat/cold  Hematologic: Negative for easy bruising, bleeding, and swelling      Physical Exam  Vitals reviewed   General: Patient is alert and oriented. He is in acute distress.   Head: Normocephalic atraumatic  Eyes: PERRLA, extraocular movements intact without icterus or injection   Neck: Full ROM without bruits, masses, or JVD  Cardiovascular: Regular rate and rhythm with S1 and S2. No murmurs, gallops, rubs, S3 or S4.   Respiratory: Lungs CTA bilaterally across all fields with normal effort and resonant percussion. No wheezes, crackles, or rhonchi   Abdomen: Soft nontender abdomen with normoactive bowel sounds. No hepatosplenomegaly or CVA tenderness  Musculoskeletal: Limited right knee ROM. Right ankle and left lower extremity have full ROM. No swelling or tenderness  Neurologic: A&O x3. Motor strength 5/5 in all extremities with intact sensation   Skin: Warm and dry without rashes or lesions  Psychiatric: Distressed but cooperative. Appropriate mood and affect.   Operative site: Managed per ortho. Extremity warm, pulse intact, capillary refill less than two seconds.      Last Recorded Vitals  /72 (BP Location: Left arm, Patient Position: Lying)   Pulse 71   Temp 36.3 °C (97.3 °F) (Temporal)   Resp 18   Wt 110 kg (241 lb 6.5 oz)   SpO2 95%     Relevant Results  Scheduled medications  aspirin, 81 mg, oral, BID  ketorolac, 15 mg, intravenous, q6h  oral hydration, 100 mL, oral, q1h while awake  oxyCODONE, 10 mg, oral, TID  [START ON 10/17/2024] polyethylene glycol, 17 g, oral, Daily  vancomycin, 1.5 g, intravenous, q12h      Continuous medications  lactated Ringer's, 20 mL/hr, Last Rate: 20 mL/hr (10/16/24 1423)      PRN  medications  PRN medications: acetaminophen, diphenhydrAMINE, HYDROmorphone, ondansetron ODT **OR** ondansetron, oxyCODONE-acetaminophen, oxyCODONE-acetaminophen, oxygen    XR knee right 1-2 views    Result Date: 10/16/2024  Interpreted By:  Amirah Sapp, STUDY: XR KNEE RIGHT 1-2 VIEWS 10/16/2024 12:35 pm   INDICATION: Signs/Symptoms:Post op knee   COMPARISON: None available.   ACCESSION NUMBER(S): [id number]   ORDERING CLINICIAN: LAURA SANCHES   TECHNIQUE: 2 portable views of right knee are completed.   FINDINGS: There is postoperative change from knee arthroplasty with femoral and tibial components anatomically aligned. No acute bony abnormality is seen.       Satisfactory appearance of right knee arthroplasty.   Signed by: Amirah Sapp 10/16/2024 12:48 PM Dictation workstation:   [id number]        Assessment/Plan   Assessment & Plan  Primary osteoarthritis of right knee  Patient status post right knee arthroplasty, clinically stable  -Managed per ortho  -Continue and reassess pain control  -Supportive care   -Discharge with physical therapy referral   HTN (hypertension)  -Continue furosemide and lisinopril   Gastroesophageal reflux disease  -Continue lansoprazole   CAD (coronary artery disease)  -Continue management with cardiology   -Continue aspirin     DVT prophylaxis  -Continue aspirin  -SCDs  -Early ambulation with physical therapy     Follow up with primary care in 1-2 weeks.          PENNY LONG

## 2024-10-16 NOTE — ASSESSMENT & PLAN NOTE
Patient status post right knee arthroplasty, clinically stable  -Managed per ortho  -Continue and reassess pain control  -Supportive care   -Discharge with physical therapy referral

## 2024-10-16 NOTE — NURSING NOTE
Patient is able to move BLE.  Patient is cleared for admission to the floor.  Report called to Marjorie on med-surg.  Patient transported to [facility] with RN and all of patient's belonging.s

## 2024-10-16 NOTE — NURSING NOTE
Patient states he continues to have surgical pain 10/10.Circulation remains adequate. Ice man maintained.Medicated with IV dilaudid for pain.

## 2024-10-16 NOTE — CONSULTS
Consults    Reason For Consult  Medical management for chronic back pain GERD hypertension and postop pain    History Of Present Illness  Alvaro Christensen is a 69 y.o. male presenting with right knee replacement.     Past Medical History  He has a past medical history of Chronic lower back pain, Delayed emergence from general anesthesia, GERD (gastroesophageal reflux disease), Hearing loss, History of peripheral edema, Hypertension, and OA (osteoarthritis).    Surgical History  He has a past surgical history that includes Other surgical history (11/02/2020); Other surgical history (Left, 11/02/2020); and Cardiac catheterization (N/A, 04/24/2024).     Social History  He reports that he quit smoking about 10 years ago. His smoking use included cigarettes. He started smoking about 42 years ago. He has a 64.2 pack-year smoking history. He has been exposed to tobacco smoke. He has never used smokeless tobacco. He reports that he does not drink alcohol and does not use drugs.    Family History  No family history on file.     Allergies  Penicillins and Sulfa (sulfonamide antibiotics)    Review of Systems  10 system review is negative  Physical Exam  Patient is alert complaining of pain  Lungs are clear  Cardiac is regular rate and rhythm normal S1-S2 without murmur gallop rub click S3 or S4  Abdomen is soft nontender positive bowel sounds there is no hepatosplenomegaly or CVA tenderness appreciated  Extremities without cyanosis clubbing erythema or edema  Operative site per Ortho extremity warm pulses intact can dorsiflex and plantarflex  Last Recorded Vitals  /72 (BP Location: Left arm, Patient Position: Lying)   Pulse 71   Temp 36.3 °C (97.3 °F) (Temporal)   Resp 18   Wt 110 kg (241 lb 6.5 oz)   SpO2 95%     Relevant Results  Scheduled medications  aspirin, 81 mg, oral, BID  [Held by provider] baclofen, 5 mg, oral, TID  [START ON 10/17/2024] furosemide, 10 mg, oral, Daily  ketorolac, 15 mg, intravenous,  q6h  [START ON 10/17/2024] lisinopril, 10 mg, oral, Daily  oral hydration, 100 mL, oral, q1h while awake  orphenadrine, 60 mg, intravenous, q12h FELICIA  oxyCODONE-acetaminophen, 1 tablet, oral, q6h  [START ON 10/17/2024] pantoprazole, 20 mg, oral, Daily before breakfast  [START ON 10/17/2024] polyethylene glycol, 17 g, oral, Daily  vancomycin, 1.5 g, intravenous, q12h      Continuous medications  lactated Ringer's, 20 mL/hr, Last Rate: 20 mL/hr (10/16/24 1423)      PRN medications  PRN medications: acetaminophen, diphenhydrAMINE, HYDROmorphone, ondansetron ODT **OR** ondansetron, [Held by provider] oxyCODONE, oxygen    No results found for this or any previous visit (from the past 24 hours).       Assessment/Plan     Status post right total knee replacement  -Management per Ortho  -PT  -Pain control  -Supportive care    Hypertension  -Continue current medications    DVT prophylaxis  -Aspirin protocol  -SCD  -Ambulate    Becki Samuel MD

## 2024-10-16 NOTE — CARE PLAN
The patient's goals for the shift include  pain management    The clinical goals for the shift include pain management.

## 2024-10-16 NOTE — OP NOTE
OPEN TOTAL KNEE ARTHROPLASTY ( BIB ROBOTIC ASSISTED DEVICE, MACK TRIATHLON ) ** Rapid Recovery ** (R) Operative Note     Date: 10/16/2024  OR Location: [facility]    Name: Alvaro Christensen, : 1955, Age: 69 y.o., MRN: [id number], Sex: male    Diagnosis  Pre-op Diagnosis      * Primary osteoarthritis of right knee [M17.11] Post-op Diagnosis     * Primary osteoarthritis of right knee [M17.11]     Procedures  OPEN TOTAL KNEE ARTHROPLASTY ( BIB ROBOTIC ASSISTED DEVICE, MACK TRIATHLON ) ** Rapid Recovery **  64939 - AL ARTHRP KNE CONDYLE&PLATU MEDIAL&LAT COMPARTMENTS      Surgeons      * Kalpesh Collado - Primary    Resident/Fellow/Other Assistant:  Surgeons and Role:  * No surgeons found with a matching role *    Procedure Summary  Anesthesia: Regional, Spinal  ASA: III  Anesthesia Staff: Anesthesiologist: Sharan Henry DO  CRNA: Roge Henry, APRN-CNP, APRN-CRNA  Estimated Blood Loss: 15 mL  Intra-op Medications:   Administrations occurring from 0935 to 1215 on 10/16/24:   Medication Name Total Dose   ropivacaine-epinephrine-clonidine-ketorolac 2.46-0.005- 0.0008-0.3mg/mL periarticular syringe 50 mL   dexAMETHasone (Decadron) injection 4 mg/mL 4 mg   LR bolus Cannot be calculated   midazolam (Versed) injection 1 mg/mL 2 mg   ondansetron (Zofran) 2 mg/mL injection 4 mg   tranexamic acid injection 100 mg/mL 1,000 mg   vancomycin IVPB 1.5 g in 300 mL diluent combination- premix 1.5 g              Anesthesia Record               Intraprocedure I/O Totals          Intake    Tranexamic Acid 0.00 mL    The total shown is the total volume documented since Anesthesia Start was filed.    vancomycin 1.5 gram/300 mL 300.00 mL    Total Intake 300 mL          Specimen: No specimens collected     Staff:   Circulator: Virginia  Scrub Person: Tatiana  Scrub Person: Mishel  Scrub Person: Renetta         Drains and/or Catheters: * None in log *    Tourniquet Times:   * Missing tourniquet times found for documented tourniquets  in lo *     Implants:  Implants       Type Name Action Serial No.       3.2MM X 110MM MACK BONE PIN  Used, Not Implanted       3.2MM X 140MM MACK BONE PIN Used, Not Implanted      Joint BASEPLATE, TRIATHLON TRITANIUM, SIZE 6, 52MM - [id number] Implanted      Joint COMPONENT, CR FEMORAL, P5, BEADED W/PA, RIGHT - [id number] Implanted       10MM TRIATHLON CS TIBIAL INSERT - X3 Implanted               Findings: Preop examination under anesthesia revealed overall well-preserved range of motion, significant varus deformity that was partially correctable manually.  After dissection down to the knee severe medial compartment and moderate lateral compartment arthritis was encountered.  Patellofemoral articulation overall was well-preserved with full-thickness intact articular cartilage of the patella.  After placement of a well-fixed total knee arthroplasty excellent range of motion, excellent stability at the arc of motion and midline patellar tracking was achieved.    Indications: Alvaro Christensen is an 69 y.o. male who is having surgery for Primary osteoarthritis of right knee [M17.11].  Here today for robotic assisted right total knee arthroplasty    The patient was seen in the preoperative area. The risks, benefits, complications, treatment options, non-operative alternatives, expected recovery and outcomes were discussed with the patient. The possibilities of reaction to medication, pulmonary aspiration, injury to surrounding structures, bleeding, recurrent infection, the need for additional procedures, failure to diagnose a condition, and creating a complication requiring transfusion or operation were discussed with the patient. The patient concurred with the proposed plan, giving informed consent.  The site of surgery was properly noted/marked if necessary per policy. The patient has been actively warmed in preoperative area. Preoperative antibiotics have been ordered and given within 1 hours of  incision. Venous thrombosis prophylaxis are not indicated.    Procedure Details: The patient was correctly identified in preoperative holding, appropriate regional blocks were provided by the department of anesthesia.  Patient was taken to the operative suite and placed in the seated position, a spinal anesthetic was administered.  The patient was then placed in the supine position.  Examination under anesthesia was carried forth.  A well-padded tourniquet was placed on the right upper thigh.  The right lower extremity was sterilely prepped and draped in the usual surgical fashion, Ioban drape and leg assistant employed.  A sterile Esmarch was used to exsanguinate the limb and the tourniquet was elevated to 250 mmHg.  Standard midline incision was made followed by a medial parapatellar arthrotomy.  A minimal medial release was performed high on the tibia to allow placement of retractors, fat pad was excised.  Femoral and tibial checkpoints were then placed followed by pelvic and tibial array (placed intra incisional).   Hip center rotation was then registered followed by location of medial and lateral malleoli.  Femoral and tibial registration was then performed with excellent accuracy.  The ACL and osteophytes were resected, anterior horn of the lateral meniscus excised.  The digital tensiometer was then utilized to tailor the osseous resections to the soft tissue tensioning.  The goal of creating a medial pivot with the knee symmetric medially and laterally in extension, medially in flexion and extension, slightly loose on the lateral side and flexion.   The femoral and tibial cuts were then made, the cut osseous fragments were removed from the knee.  Meniscal cartilages excised, posterior osteophytes removed.  Trial implants were then placed with a best fit size 6 tibia, size 5 femur and a 10 mm polyethylene.  The digital tensiometer revealed excellent stability both in flexion and extension with approximately 1  mm of additional lateral laxity in flexion.  The patella was innervated and lateral facetectomy was performed..  Excellent range of motion, excellent stability throughout the arc of motion.  Tibial rotation was then set to match the femur roughly corresponding to the medial third of the tibial tuberosity, the baseplate pinned anteriorly.  The knee was flexed up.  Lug holes drilled in the femur for press-fit implant.  Proximal tibial preparation was then completed in conventional fashion for press-fit technique.  The knee was then soaked with Irrisept and thoroughly irrigated.  The posterior capsule and soft tissue envelope injected with a local anesthetic cocktail.  Any bleeding vessels were coagulated with electrocautery.  The tibial component was impacted onto the cut surface of the tibia.  The polyethylene was impacted onto the baseplate.  The femoral component was then impacted onto the cut surface of the femur.  The knee was then reduced and placed in full extension.  The knee once again was soaked with Irrisept and thoroughly irrigated.  The arthrotomy was then closed proximally and distally with #1 Vicryl suture, from the proximal pole of the patella to the distal pole of the patella with interrupted figure-of-eight #2 FiberWire suture.  A watertight closure was achieved.  The skin and subcutaneous tissues were closed in typical layered fashion.  Skin edges were reapproximated with skin glue followed by Steri-Strips with adhesive.  A Mepilex Ag dressing was applied followed by a long leg ROSAMARIA hose.  The tourniquet was then deflated with excellent return of blood flow to the extremity.  The patient was lightened from anesthesia and taken to recovery room in stable condition without complication  Complications:  None; patient tolerated the procedure well.    Disposition: PACU - hemodynamically stable.  Condition: stable         Additional Details: arturo    Attending Attestation:     Kalpesh Collado  Phone Number:  [phone]

## 2024-10-16 NOTE — NURSING NOTE
Admitted to room 217 from surgery. Oriented to room. Call light given.Wife at bedside. Circulation to right lower leg adequate. Right leg warm to touch with 2+ pulses. Dressing dry and intact.

## 2024-10-16 NOTE — NURSING NOTE
Assumed care of patient. Report received from EDILBERTO Amador. Patient resting in bed watching TV. Vital signs stable, no acute distress. Surgical dressing to right knee clean, dry and intact with ice man cooler in place and good plantar and dorsiflexion. Has voided since surgery. Complaining of 10/10 pain in the right knee and will be medicated per orders. Call light is within reach and bed alarm is activated.

## 2024-10-16 NOTE — ANESTHESIA PROCEDURE NOTES
Spinal Block    Patient location during procedure: OR  Start time: 10/16/2024 9:50 AM  End time: 10/16/2024 10:05 AM  Reason for block: primary anesthetic, at surgeon's request and post-op pain management  Staffing  Performed: CRNA   Authorized by: Sharan Henry DO    Performed by: Roge Henry, APRN-CNP, APRN-CRNA    Preanesthetic Checklist  Completed: patient identified, IV checked, risks and benefits discussed, surgical consent, pre-op evaluation, timeout performed and sterile techniques followed  Block Timeout  RN/Licensed healthcare professional reads aloud to the Anesthesia provider and entire team: Patient identity, procedure with side and site, patient position, and as applicable the availability of implants/special equipment/special requirements.  Patient on coagulant treatment: no  Timeout performed at: 10/16/2024 9:50 AM  Spinal Block  Patient position: sitting  Prep: Betadine  Sterility prep: drape, gloves, hand hygiene and mask  Sedation level: no sedation  Patient monitoring: blood pressure, continuous pulse oximetry and heart rate  Approach: midline  Vertebral space: L2-3  Injection technique: single-shot  Needle  Needle type: pencil-point   Needle gauge: 25 G  Needle length: 3.5 in  Free flowing CSF: yes    Assessment  Sensory level: T4 bilateral  Block outcome: patient comfortable  Procedure assessment: patient tolerated procedure well with no immediate complications

## 2024-10-16 NOTE — ANESTHESIA PROCEDURE NOTES
Peripheral Block    Patient location during procedure: pre-op  Start time: 10/16/2024 9:11 AM  End time: 10/16/2024 9:12 AM  Reason for block: at surgeon's request and post-op pain management  Staffing  Performed: attending   Authorized by: Sharan Henry DO    Performed by: Sharan Henry DO  Preanesthetic Checklist  Completed: patient identified, IV checked, site marked, risks and benefits discussed, surgical consent, monitors and equipment checked, pre-op evaluation and timeout performed   Timeout performed at: 10/16/2024 9:05 AM  Peripheral Block  Patient position: laying flat  Prep: ChloraPrep  Patient monitoring: heart rate, cardiac monitor and continuous pulse ox  Block type: adductor canal  Laterality: right  Injection technique: single-shot  Guidance: ultrasound guided  Local infiltration: ropivacaine  Infiltration strength: 0.5 %  Dose: 30 mL  Needle  Needle gauge: 22 G  Needle length: 10 cm  Needle localization: ultrasound guidance     image stored in chart  Assessment  Injection assessment: negative aspiration for heme, no paresthesia on injection and incremental injection  Paresthesia pain: none  Heart rate change: no  Slow fractionated injection: yes  Additional Notes  With 4mg decadron

## 2024-10-16 NOTE — ASSESSMENT & PLAN NOTE
-Continue management with cardiology   -Continue aspirin     DVT prophylaxis  -Continue aspirin  -SCDs  -Early ambulation with physical therapy     Follow up with primary care in 1-2 weeks.

## 2024-10-17 ENCOUNTER — PHARMACY VISIT (OUTPATIENT)
Dept: PHARMACY | Facility: CLINIC | Age: 69
End: 2024-10-17
Payer: MEDICARE

## 2024-10-17 ENCOUNTER — DOCUMENTATION (OUTPATIENT)
Dept: INPATIENT UNIT | Facility: HOSPITAL | Age: 69
End: 2024-10-17
Payer: COMMERCIAL

## 2024-10-17 VITALS
RESPIRATION RATE: 16 BRPM | HEIGHT: 70 IN | SYSTOLIC BLOOD PRESSURE: 122 MMHG | WEIGHT: 241.4 LBS | HEART RATE: 74 BPM | DIASTOLIC BLOOD PRESSURE: 68 MMHG | BODY MASS INDEX: 34.56 KG/M2 | OXYGEN SATURATION: 96 % | TEMPERATURE: 97 F

## 2024-10-17 PROBLEM — M17.11 PRIMARY OSTEOARTHRITIS OF RIGHT KNEE: Status: RESOLVED | Noted: 2024-10-16 | Resolved: 2024-10-17

## 2024-10-17 LAB
ANION GAP SERPL CALC-SCNC: 15 MMOL/L (ref 10–20)
BUN SERPL-MCNC: 19 MG/DL (ref 6–23)
CALCIUM SERPL-MCNC: 8.6 MG/DL (ref 8.6–10.3)
CHLORIDE SERPL-SCNC: 104 MMOL/L (ref 98–107)
CO2 SERPL-SCNC: 24 MMOL/L (ref 21–32)
CREAT SERPL-MCNC: 0.92 MG/DL (ref 0.5–1.3)
EGFRCR SERPLBLD CKD-EPI 2021: >90 ML/MIN/1.73M*2
ERYTHROCYTE [DISTWIDTH] IN BLOOD BY AUTOMATED COUNT: 12.3 % (ref 11.5–14.5)
GLUCOSE SERPL-MCNC: 128 MG/DL (ref 74–99)
HCT VFR BLD AUTO: 37.4 % (ref 41–52)
HGB BLD-MCNC: 12.9 G/DL (ref 13.5–17.5)
MCH RBC QN AUTO: 30.2 PG (ref 26–34)
MCHC RBC AUTO-ENTMCNC: 34.5 G/DL (ref 32–36)
MCV RBC AUTO: 88 FL (ref 80–100)
NRBC BLD-RTO: ABNORMAL /100{WBCS}
PLATELET # BLD AUTO: 186 X10*3/UL (ref 150–450)
POTASSIUM SERPL-SCNC: 4.5 MMOL/L (ref 3.5–5.3)
RBC # BLD AUTO: 4.27 X10*6/UL (ref 4.5–5.9)
SODIUM SERPL-SCNC: 138 MMOL/L (ref 136–145)
WBC # BLD AUTO: 12.4 X10*3/UL (ref 4.4–11.3)

## 2024-10-17 PROCEDURE — 2500000004 HC RX 250 GENERAL PHARMACY W/ HCPCS (ALT 636 FOR OP/ED): Performed by: ORTHOPAEDIC SURGERY

## 2024-10-17 PROCEDURE — 80048 BASIC METABOLIC PNL TOTAL CA: CPT | Performed by: STUDENT IN AN ORGANIZED HEALTH CARE EDUCATION/TRAINING PROGRAM

## 2024-10-17 PROCEDURE — 2500000001 HC RX 250 WO HCPCS SELF ADMINISTERED DRUGS (ALT 637 FOR MEDICARE OP): Performed by: ORTHOPAEDIC SURGERY

## 2024-10-17 PROCEDURE — 2500000004 HC RX 250 GENERAL PHARMACY W/ HCPCS (ALT 636 FOR OP/ED): Performed by: EMERGENCY MEDICINE

## 2024-10-17 PROCEDURE — 36415 COLL VENOUS BLD VENIPUNCTURE: CPT | Performed by: STUDENT IN AN ORGANIZED HEALTH CARE EDUCATION/TRAINING PROGRAM

## 2024-10-17 PROCEDURE — 2500000001 HC RX 250 WO HCPCS SELF ADMINISTERED DRUGS (ALT 637 FOR MEDICARE OP): Performed by: EMERGENCY MEDICINE

## 2024-10-17 PROCEDURE — 85027 COMPLETE CBC AUTOMATED: CPT | Performed by: STUDENT IN AN ORGANIZED HEALTH CARE EDUCATION/TRAINING PROGRAM

## 2024-10-17 PROCEDURE — 2500000002 HC RX 250 W HCPCS SELF ADMINISTERED DRUGS (ALT 637 FOR MEDICARE OP, ALT 636 FOR OP/ED)

## 2024-10-17 PROCEDURE — 97161 PT EVAL LOW COMPLEX 20 MIN: CPT | Mod: GP

## 2024-10-17 PROCEDURE — 99221 1ST HOSP IP/OBS SF/LOW 40: CPT | Performed by: EMERGENCY MEDICINE

## 2024-10-17 PROCEDURE — 97116 GAIT TRAINING THERAPY: CPT | Mod: GP

## 2024-10-17 PROCEDURE — RXMED WILLOW AMBULATORY MEDICATION CHARGE

## 2024-10-17 PROCEDURE — 97110 THERAPEUTIC EXERCISES: CPT | Mod: GP

## 2024-10-17 PROCEDURE — 7100000011 HC EXTENDED STAY RECOVERY HOURLY - NURSING UNIT

## 2024-10-17 RX ORDER — BACLOFEN 10 MG/1
5 TABLET ORAL 3 TIMES DAILY
Qty: 45 TABLET | Refills: 0 | Status: SHIPPED | OUTPATIENT
Start: 2024-10-17

## 2024-10-17 RX ORDER — PANTOPRAZOLE SODIUM 20 MG/1
20 TABLET, DELAYED RELEASE ORAL ONCE
Status: DISCONTINUED | OUTPATIENT
Start: 2024-10-17 | End: 2024-10-17 | Stop reason: HOSPADM

## 2024-10-17 ASSESSMENT — PAIN - FUNCTIONAL ASSESSMENT
PAIN_FUNCTIONAL_ASSESSMENT: UNABLE TO SELF-REPORT
PAIN_FUNCTIONAL_ASSESSMENT: 0-10

## 2024-10-17 ASSESSMENT — COGNITIVE AND FUNCTIONAL STATUS - GENERAL
TURNING FROM BACK TO SIDE WHILE IN FLAT BAD: A LITTLE
MOVING TO AND FROM BED TO CHAIR: A LITTLE
MOBILITY SCORE: 19
CLIMB 3 TO 5 STEPS WITH RAILING: A LITTLE
DAILY ACTIVITIY SCORE: 21
WALKING IN HOSPITAL ROOM: A LITTLE
WALKING IN HOSPITAL ROOM: A LITTLE
HELP NEEDED FOR BATHING: A LITTLE
DRESSING REGULAR LOWER BODY CLOTHING: A LITTLE
STANDING UP FROM CHAIR USING ARMS: A LITTLE
TOILETING: A LITTLE
MOBILITY SCORE: 22
CLIMB 3 TO 5 STEPS WITH RAILING: A LITTLE

## 2024-10-17 ASSESSMENT — PAIN SCALES - GENERAL
PAINLEVEL_OUTOF10: 4
PAINLEVEL_OUTOF10: 5 - MODERATE PAIN
PAINLEVEL_OUTOF10: 8
PAINLEVEL_OUTOF10: 6
PAINLEVEL_OUTOF10: 5 - MODERATE PAIN
PAINLEVEL_OUTOF10: 8
PAINLEVEL_OUTOF10: 5 - MODERATE PAIN
PAINLEVEL_OUTOF10: 6
PAINLEVEL_OUTOF10: 8

## 2024-10-17 ASSESSMENT — PAIN DESCRIPTION - DESCRIPTORS
DESCRIPTORS: SHARP
DESCRIPTORS: ACHING
DESCRIPTORS: ACHING;STABBING
DESCRIPTORS: ACHING

## 2024-10-17 ASSESSMENT — ACTIVITIES OF DAILY LIVING (ADL)
ADLS_ADDRESSED: YES
ADL_ASSISTANCE: INDEPENDENT

## 2024-10-17 ASSESSMENT — PAIN DESCRIPTION - LOCATION: LOCATION: KNEE

## 2024-10-17 ASSESSMENT — PAIN DESCRIPTION - ORIENTATION: ORIENTATION: RIGHT

## 2024-10-17 NOTE — PROGRESS NOTES
Physical Therapy Evaluation & Treatment    Patient Name: Alvaro Christensen  MRN: [id number]  Department: [facility]  Room: [address] [place] [address]  Today's Date: 10/17/2024   Time Calculation  Start Time: 0915  Stop Time: 0959  Time Calculation (min): 44 min    Assessment/Plan   PT Assessment  PT Assessment Results: Decreased strength, Decreased range of motion, Decreased endurance, Impaired balance, Orthopedic restrictions, Pain  Rehab Prognosis: Excellent  Evaluation/Treatment Tolerance: Patient tolerated treatment well  End of Session Communication: Bedside nurse  Assessment Comment: Pt presents with impaired functional mobility s/p R TKR. Recommend discharge home with intermittent assistance and home health PT. Pt was issued HEP handout. Pt verbalized and demonstrated understanding.   End of Session Patient Position: Up in chair, BLE elevated, ice to surgical site, call light in reach, needs met, RN aware.  IP OR SWING BED PT PLAN  Inpatient or Swing Bed: Inpatient  PT Plan  Treatment/Interventions: Bed mobility, Transfer training, Gait training, Stair training, Balance training, Endurance training, Strengthening, Therapeutic exercise, Range of motion, Home exercise program, Therapeutic activity, Postural re-education, Positioning  PT Plan: Ongoing PT  PT Frequency: BID  PT Discharge Recommendations: Low intensity level of continued care  Equipment Recommended upon Discharge: Wheeled walker  PT Recommended Transfer Status: Stand by assist, Assistive device  PT - OK to Discharge: Yes      Subjective     General Visit Information:  General  Reason for Referral: R TKR  Referred By: Dr. Collado  Past Medical History Relevant to Rehab: OA, HTN, GERD, LBP  Family/Caregiver Present: No  Prior to Session Communication: Bedside nurse  Patient Position Received: Bed, 3 rail up, Alarm on  General Comment: R knee post-op dressing dry and intact.     Home Living:  Home Living  Type of Home: House  Lives With: Significant other  Home Adaptive  Equipment: Walker rolling or standard  Home Layout: Multi-level, Stairs to alternate level with rails  Alternate Level Stairs-Number of Steps: 12  Home Access: Stairs to enter with rails  Entrance Stairs-Rails: Both (too far apart to reach both at the same time)  Entrance Stairs-Number of Steps: 4  Prior Level of Function:  Prior Function Per Pt/Caregiver Report  Level of [place]: Independent with ADLs and functional transfers, Independent with homemaking with ambulation  ADL Assistance: Independent  Homemaking Assistance: Independent  Ambulatory Assistance: Independent  Vocational: Full time employment  Prior Function Comments: 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  Precautions:  Precautions  LE Weight Bearing Status: Weight Bearing as Tolerated  Medical Precautions: Fall precautions  Post-Surgical Precautions: Right total knee precautions       Objective   Pain:  Pain Assessment  Pain Assessment: 0-10  0-10 (Numeric) Pain Score: 6 (6/10 at rest; 8/10 with mobility)  Pain Type: Surgical pain  Pain Location: Knee  Pain Orientation: Right  Pain Interventions: Cold applied, Repositioned, Ambulation/increased activity  Cognition:  Cognition  Overall Cognitive Status: Within Functional Limits  Attention: Within Functional Limits  Memory: Within Funtional Limits  Problem Solving: Within Functional Limits  Numeric Reasoning: Within Functional Limits  Abstract Reasoning: Within Functional Limits  Safety/Judgement: Within Functional Limits  Insight: Within function limits    General Assessments:  Activity Tolerance  Endurance: Endurance does not limit participation in activity    Sensation  Light Touch: No apparent deficits    Coordination  Movements are Fluid and Coordinated: Yes    Postural Control  Postural Control: Within Functional Limits    Static Sitting Balance  Static Sitting-Balance Support: Feet supported  Static Sitting-Level of Assistance:  Independent  Dynamic Sitting Balance  Dynamic Sitting-Balance Support: Feet supported  Dynamic Sitting-Level of Assistance: Independent    Static Standing Balance  Static Standing-Balance Support: Bilateral upper extremity supported (with RW)  Static Standing-Level of Assistance: Distant supervision  Dynamic Standing Balance  Dynamic Standing-Balance Support: Bilateral upper extremity supported (with RW)  Dynamic Standing-Level of Assistance: Distant supervision  Functional Assessments:  ADL  ADL's Addressed: Yes  UE Dressing Assistance: Independent  LE Dressing Assistance: Stand by  LE Dressing Deficit: Verbal cueing, Supervision/safety    Bed Mobility  Bed Mobility: Yes  Bed Mobility 1  Bed Mobility 1: Supine to sitting  Level of Assistance 1: Distant supervision    Transfers  Transfer: Yes  Transfer 1  Transfer From 1: Bed to, Chair with arms to  Transfer to 1: Chair with arms  Technique 1: Sit to stand, Stand to sit  Transfer Device 1: Walker  Transfer Level of Assistance 1: Distant supervision, Minimal verbal cues (cues for hand placement)    Ambulation/Gait Training  Ambulation/Gait Training Performed: Yes  Ambulation/Gait Training 1  Surface 1: Level tile  Device 1: Rolling walker  Assistance 1: Close supervision, Minimal verbal cues (cues for sequencing)  Quality of Gait 1: Decreased step length, Antalgic (decreased clayton, no LOB noted.)  Comments/Distance (ft) 1: 150 feet x 2    Stairs  Stairs: Yes  Stairs  Rails 1: Right (BUE support on one rail to simulate home)  Device 1: Railing  Assistance 1: Close supervision, Minimal verbal cues (cues for sequencing)  Comment/Number of Steps 1: 3  Pt demonstrated step to pattern, decreased clayton, no LOB noted.     Extremity/Trunk Assessments:  RUE   RUE : Within Functional Limits  LUE   LUE: Within Functional Limits  RLE   RLE : Exceptions to WFL, knee ROM/strength limited due to post-op pain and surgeon restrictions.   LLE   LLE : Within Functional  Limits  Treatments:  Therapeutic Exercise  Therapeutic Exercise Performed: Yes  B ankle pumps, R quad sets, R gluteal sets, R heel slides, R SAQ, R hip abduction, and R SLR x 10 reps each.    Outcome Measures:  [facility] Basic Mobility  Turning from your back to your side while in a flat bed without using bedrails: None  Moving from lying on your back to sitting on the side of a flat bed without using bedrails: None  Moving to and from bed to chair (including a wheelchair): None  Standing up from a chair using your arms (e.g. wheelchair or bedside chair): None  To walk in hospital room: A little  Climbing 3-5 steps with railing: A little  Basic Mobility - Total Score: 22    Tara Berger, PT, DPT

## 2024-10-17 NOTE — CARE PLAN
The patient's goals for the shift include  improved pain and ambulation    The clinical goals for the shift include pain management.

## 2024-10-17 NOTE — NURSING NOTE
Patient is now sitting up in chair in room. He is asking for something for the pain which he is rating at 6/10.

## 2024-10-17 NOTE — PROGRESS NOTES
Patient seen, chart reviewed.  States that his pain is much better controlled now.  No other acute complaints.  Vital signs are stable.         Lizzy Luna MD

## 2024-10-17 NOTE — NURSING NOTE
Went into room to check on patient. He is now awake and rates right knee pian at 8/10. He states the pain in not in the knee but is below the knee.   Dressing and ice man intact to the right knee.  Call light within reach, continue to monitor.

## 2024-10-17 NOTE — CARE PLAN
TCC met with patient at the bedside to discuss care/discharge plan.  Pt POD#1 right knee replacement.  Plan is home with OPT on 10/21/24 at 1:20 pm[facility]  Spouse will transport home and assist with care as needed.

## 2024-10-17 NOTE — NURSING NOTE
Patient now awake and states pain in right knee is 5/10. Supplied new ice for the ice man and SCD compression remains intact to right knee.  Assisted patient up from the bed to the bathroom and then back to bed.  Continue to monitor.

## 2024-10-17 NOTE — NURSING NOTE
Assumed care of patient this am. Patient sleeping in bed at this time.  Call light within reach, continue to monitor.

## 2024-10-17 NOTE — NURSING NOTE
Saline lock removed prior to discharge and sheath fully intact. Pressure dressing applied to the site which did ooze for several minutes after removal. Pressure dressing applied to the site. Site clean/dry/intact upon discharge. Dressing to right knee clean/dry/intact upon discharge. Patient left via wheelchair accompanied by his son.  Patient was able to get into the truck without much difficulty.

## 2024-10-17 NOTE — NURSING NOTE
Patient given IV Norflex per order. Patient placed in a supine position per directions. Patient stated he experienced no lightheadedness nor dizziness.  Continue to monitor.

## 2024-10-17 NOTE — CARE PLAN
The patient's goals for the shift include      The clinical goals for the shift include Pain management    Over the shift, the patient did not make  some progress toward the following goals. Patient's pain dis decrease from 8/10 down to 5-6/10. Barriers to progression include patient is a chronic pain patient..   Problem: Pain  Goal: Takes deep breaths with improved pain control throughout the shift  Outcome: Met     Problem: Pain  Goal: Turns in bed with improved pain control throughout the shift  Outcome: Met     Problem: Pain  Goal: Walks with improved pain control throughout the shift  Outcome: Met     Problem: Pain  Goal: Participates in PT with improved pain control throughout the shift  Outcome: Met     Problem: Pain  Goal: Free from opioid side effects throughout the shift  Outcome: Met     Problem: Pain  Goal: Free from acute confusion related to pain meds throughout the shift  Outcome: Met     Problem: Fall/Injury  Goal: Not fall by end of shift  Outcome: Met     Problem: Fall/Injury  Goal: Be free from injury by end of the shift  Outcome: Met     Problem: Fall/Injury  Goal: Use assistive devices by end of the shift  Outcome: Met   Recommendations to address these barriers include keeping with the same pain regime as outlined for him by his pain management doctor.

## 2024-10-17 NOTE — PROGRESS NOTES
Medication Education     Medication education for Alvaro Christensen was provided to the patient  for the following medication(s):  Percocet  Baclofen    Discussed OTC use of aspirin/laxatives.      Medication education provided by a Pharmacist:  Dose, frequency, storage Proper dose, indication, possible ADRs Refilling the medication  How the medication works and benefits of taking it Benefits of taking the medication  Importance of compliance    Identified potential barriers to education:  None    Method(s) of Education:  Verbal Written materials provided and reviewed    An opportunity to ask questions and receive answers was provided.     Assessment of understanding the patient :  2= meets goals/outcomes    Additional Notes (if applicable):     M2B delivered.    Douglas Feng, PharmD

## 2024-10-17 NOTE — PROGRESS NOTES
Met with patient and had thorough discussion regarding post-operative use of oxycodone 10mg TID and percocet 5/325mg 1tab every 4-6 hours as needed for pain.  Dr. Collado aware of home oxycodone dose 10mg TID, ordered percocet 5/325 q4-6 prn and baclofen 10mg TID PRN.    Provided patient with SAMPLE grid of how to organize medications, utilizing percocet as needed for breakthrough pain:      12:00a 4:00a 8:00a 12:00p 4:00p 8:00p   Percocet Oxycodone Percocet Oxycodone Percocet Oxycodone         This is only a sample medication schedule. Narcotics should be taken as prescribed, as needed for pain control and breakthrough pain.    Discussed signs of overmedication, when to call surgeon and when to call 9-1-1.  Patient provided with Lexidrug and Micromedex information per  formulary for both oxycodone and percocet.    Patient verbalizes understanding of medication use, side effects, and when to call for help.

## 2024-10-17 NOTE — NURSING NOTE
Patient ambulated to restroom with walker and standby assist and did well. Voided 800mL of clear yellow urine and then continued to walk around the room before returning to bed. Patient was positioned for comfort with ice man cooler in place. No stated needs. Call light is within reach and bed alarm is activated.

## 2024-10-17 NOTE — PROGRESS NOTES
Interdisciplinary Rounds were completed at the bedside with Patient.  Staff participating in rounds included: Clinical Nurse Orthopedic Coordinator Transitional Care Coordinator Nursing Leadership.  Topics discussed included: Today's Plan of Care Discharge Plan and Accommodations Physical Therapy Medications/Preferred Pharmacy and the Patient was given the opportunity to ask additional questions or bring up any concerns at that time.  During the final discharge discussion and review of instructions, they will have another opportunity to review questions or concerns prior to leaving our care.  Patient was given information on who to call post-discharge should new questions or concerns arise.      Spoke with Dr. Collado regarding discharge pain management - Dr. Collado states patient will remain on home dose of 10mg oxycodone TID and take prescribed percoced 5/325mg PRN for breakthrough surgical pain every 4-6 hours.  Thorough education provided to patient regarding organization of medications and implications for overdose. Medication administration chart provided to help organize medications.    At the time of this discussion, the patient's plan includes:    Discharge Date/Disposition:  Home, Today with, Outpatient Therapy Services to begin 10/21 at 1:20pm  Discharge Needs: No Equipment Needs Identified  Medications/Pharmacy: [id number] service utilized for discharge prescriptions through [facility]

## 2024-10-17 NOTE — DISCHARGE SUMMARY
Discharge Diagnosis  Primary osteoarthritis of right knee    Issues Requiring Follow-Up  Outpt PT, multimodal analgesics and DVT ppl    Test Results Pending At Discharge  Pending Labs       No current pending labs.            Hospital Course   Ext rec due to poor pain tolerance.  Pt doing well this morning, c/o pain. PT this am, anticipate discharge home today.  VSS, AF, Hb stable     Pertinent Physical Exam At Time of Discharge  Physical Exam:  AAO x 3, dressing c/d/I, motor and sensory intact, no signs of DVT or infection    Home Medications     Medication List      START taking these medications     polyethylene glycol 17 gram packet; Commonly known as: Glycolax,   Miralax; Take 17 g by mouth once daily. Take once or twice a day when   taking the Percocet/oxycodone to avoid constipation     CHANGE how you take these medications     aspirin 81 mg EC tablet; Take 1 tablet (81 mg) by mouth 2 times a day   for 14 days. Resume normal dose of once a day after 14 days.; What   changed: when to take this, additional instructions     CONTINUE taking these medications     ADULTS MULTIVITAMIN ORAL   baclofen 10 mg tablet; Commonly known as: Lioresal; Take 1 tablet by   mouth 3 times daily as needed muscle spasms   furosemide 20 mg tablet; Commonly known as: Lasix   lansoprazole 15 mg DR capsule; Commonly known as: Prevacid   lisinopril 10 mg tablet   meloxicam 15 mg tablet; Commonly known as: Mobic   oxyCODONE 10 mg immediate release tablet; Commonly known as: Roxicodone   oxyCODONE-acetaminophen 5-325 mg tablet; Commonly known as: Percocet;   Take 1 tablet by mouth every 4-6 hours as needed pain     STOP taking these medications     chlorhexidine 0.12 % solution; Commonly known as: Peridex       Outpatient Follow-Up  See follow up providers and discharge instructions    Kalpesh Collado DO

## 2024-10-17 NOTE — NURSING NOTE
Morning labs drawn and sent to the lab. Pain has been controlled much better. Medicated with scheduled toradol and is comfortable at this time. No stated needs. Call light is within reach and bed alarm is activated.

## 2024-10-17 NOTE — CONSULTS
Consults    Reason For Consult  Medical management for history of chronic back pain GERD hypertension and osteoarthritis    History Of Present Illness  Alvaro Christensen is a 69 y.o. male presenting with Right total knee replacement     Past Medical History  He has a past medical history of Chronic lower back pain, Delayed emergence from general anesthesia, GERD (gastroesophageal reflux disease), Hearing loss, History of peripheral edema, Hypertension, and OA (osteoarthritis).    Surgical History  He has a past surgical history that includes Other surgical history (11/02/2020); Other surgical history (Left, 11/02/2020); and Cardiac catheterization (N/A, 04/24/2024).     Social History  He reports that he quit smoking about 10 years ago. His smoking use included cigarettes. He started smoking about 42 years ago. He has a 64.2 pack-year smoking history. He has been exposed to tobacco smoke. He has never used smokeless tobacco. He reports that he does not drink alcohol and does not use drugs.    Family History  No family history on file.     Allergies  Penicillins and Sulfa (sulfonamide antibiotics)    Review of Systems  10 system review is noncontributory  Physical Exam  Patient is alert in no acute distress   Lungs are clear to auscultation and  Cardiac is regular rate and rhythm normal S1-S2 without murmur gallop rub click S3 or S4  Abdomen is benign  Extremities without cyanosis clubbing erythema or edema  Operative site exam per Ortho extremity is warm pulses intact patient can dorsiflex and plantar  Last Recorded Vitals  /68 (BP Location: Right arm, Patient Position: Lying)   Pulse 74   Temp 36.1 °C (97 °F) (Temporal)   Resp 16   Wt 110 kg (241 lb 6.5 oz)   SpO2 96%     Relevant Results  Scheduled medications  aspirin, 81 mg, oral, BID  [Held by provider] baclofen, 5 mg, oral, TID  furosemide, 10 mg, oral, Daily  lisinopril, 10 mg, oral, Daily  oral hydration, 100 mL, oral, q1h while  awake  orphenadrine, 60 mg, intravenous, q12h FELICIA  oxyCODONE-acetaminophen, 1 tablet, oral, q6h  pantoprazole, 20 mg, oral, Daily before breakfast  [START ON 10/18/2024] pantoprazole, 20 mg, oral, Daily before breakfast  polyethylene glycol, 17 g, oral, Daily      Continuous medications  lactated Ringer's, 20 mL/hr, Last Rate: 20 mL/hr (10/16/24 1423)      PRN medications  PRN medications: acetaminophen, benzocaine-menthol, diphenhydrAMINE, docusate sodium, HYDROmorphone, lubricating eye drops, melatonin, ondansetron ODT **OR** ondansetron, [Held by provider] oxyCODONE, oxygen, simethicone, sodium chloride    Results for orders placed or performed during the hospital encounter of 10/16/24 (from the past 24 hours)   CBC   Result Value Ref Range    WBC 12.4 (H) 4.4 - 11.3 x10*3/uL    nRBC      RBC 4.27 (L) 4.50 - 5.90 x10*6/uL    Hemoglobin 12.9 (L) 13.5 - 17.5 g/dL    Hematocrit 37.4 (L) 41.0 - 52.0 %    MCV 88 80 - 100 fL    MCH 30.2 26.0 - 34.0 pg    MCHC 34.5 32.0 - 36.0 g/dL    RDW 12.3 11.5 - 14.5 %    Platelets 186 150 - 450 x10*3/uL   Basic Metabolic Panel   Result Value Ref Range    Glucose 128 (H) 74 - 99 mg/dL    Sodium 138 136 - 145 mmol/L    Potassium 4.5 3.5 - 5.3 mmol/L    Chloride 104 98 - 107 mmol/L    Bicarbonate 24 21 - 32 mmol/L    Anion Gap 15 10 - 20 mmol/L    Urea Nitrogen 19 6 - 23 mg/dL    Creatinine 0.92 0.50 - 1.30 mg/dL    eGFR >90 >60 mL/min/1.73m*2    Calcium 8.6 8.6 - 10.3 mg/dL        Assessment/Plan   Status post right total knee replacement  -Management per Ortho  -PT  -Pain control  -Supportive care     Hypertension  -Continue current medications     DVT prophylaxis  -Aspirin protocol  -SCD  -Ambulate    Patient is medically stable for discharge to home    Becki Samuel MD

## 2024-10-17 NOTE — PROGRESS NOTES
Alvaro Christensen is a 69 y.o. male on day 1 of admission status post right total knee arthroplasty secondary to unilateral osteoarthritis. He is clinically stable.      Subjective   Patient complains of 8-9/10 pain just distal to the right knee. He denies chest pain, shortness of breath, GI upset, and numbness.        Objective     Last Recorded Vitals  /68 (BP Location: Right arm, Patient Position: Lying)   Pulse 74   Temp 36.1 °C (97 °F) (Temporal)   Resp 16   Wt 110 kg (241 lb 6.5 oz)   SpO2 96%   Intake/Output last 3 Shifts:    Intake/Output Summary (Last 24 hours) at 10/17/2024 0859  Last data filed at 10/17/2024 0832  Gross per 24 hour   Intake 2989.67 ml   Output 3625 ml   Net -635.33 ml       Admission Weight  Weight: 110 kg (242 lb 8.1 oz) (10/15/24 1322)    Daily Weight  10/16/24 : 110 kg (241 lb 6.5 oz)    Image Results  XR knee right 1-2 views  Narrative: Interpreted By:  Amirah Sapp,   STUDY:  XR KNEE RIGHT 1-2 VIEWS 10/16/2024 12:35 pm      INDICATION:  Signs/Symptoms:Post op knee      COMPARISON:  None available.      ACCESSION NUMBER(S):  [id number]      ORDERING CLINICIAN:  LAURA SANCHES      TECHNIQUE:  2 portable views of right knee are completed.      FINDINGS:  There is postoperative change from knee arthroplasty with femoral and  tibial components anatomically aligned. No acute bony abnormality is  seen.      Impression: Satisfactory appearance of right knee arthroplasty.      Signed by: Amirah Sapp 10/16/2024 12:48 PM  Dictation workstation:   [id number]      Physical Exam  Vitals reviewed   General: Patient alert and oriented, in some distress   Cardiovascular: Regular rate and rhythm with S1 and S2. No murmurs, gallops, rubs, S3 or S4.   Respiratory: Lungs CTA bilaterally across all fields with normal effort and resonant percussion. No wheezes, crackles, or rhonchi   Abdomen: Soft nontender abdomen with normoactive bowel sounds. No hepatosplenomegaly or CVA  tenderness  Musculoskeletal: Limited right knee ROM. Right ankle and left lower extremity have full ROM. No swelling or tenderness  Neurologic: A&O x3. Motor strength 5/5 in all extremities with intact sensation   Operative site: Managed per ortho. Extremity warm, pulse intact, capillary refill less than two seconds.     Relevant Results  Scheduled medications  aspirin, 81 mg, oral, BID  [Held by provider] baclofen, 5 mg, oral, TID  furosemide, 10 mg, oral, Daily  lisinopril, 10 mg, oral, Daily  oral hydration, 100 mL, oral, q1h while awake  orphenadrine, 60 mg, intravenous, q12h FELICIA  oxyCODONE-acetaminophen, 1 tablet, oral, q6h  pantoprazole, 20 mg, oral, Daily before breakfast  polyethylene glycol, 17 g, oral, Daily      Continuous medications  lactated Ringer's, 20 mL/hr, Last Rate: 20 mL/hr (10/16/24 1423)      PRN medications  PRN medications: acetaminophen, benzocaine-menthol, diphenhydrAMINE, docusate sodium, HYDROmorphone, lubricating eye drops, melatonin, ondansetron ODT **OR** ondansetron, [Held by provider] oxyCODONE, oxygen, simethicone, sodium chloride    Results for orders placed or performed during the hospital encounter of 10/16/24 (from the past 24 hours)   CBC   Result Value Ref Range    WBC 12.4 (H) 4.4 - 11.3 x10*3/uL    nRBC      RBC 4.27 (L) 4.50 - 5.90 x10*6/uL    Hemoglobin 12.9 (L) 13.5 - 17.5 g/dL    Hematocrit 37.4 (L) 41.0 - 52.0 %    MCV 88 80 - 100 fL    MCH 30.2 26.0 - 34.0 pg    MCHC 34.5 32.0 - 36.0 g/dL    RDW 12.3 11.5 - 14.5 %    Platelets 186 150 - 450 x10*3/uL   Basic Metabolic Panel   Result Value Ref Range    Glucose 128 (H) 74 - 99 mg/dL    Sodium 138 136 - 145 mmol/L    Potassium 4.5 3.5 - 5.3 mmol/L    Chloride 104 98 - 107 mmol/L    Bicarbonate 24 21 - 32 mmol/L    Anion Gap 15 10 - 20 mmol/L    Urea Nitrogen 19 6 - 23 mg/dL    Creatinine 0.92 0.50 - 1.30 mg/dL    eGFR >90 >60 mL/min/1.73m*2    Calcium 8.6 8.6 - 10.3 mg/dL     XR knee right 1-2 views    Result Date:  10/16/2024  Interpreted By:  Amirah Sapp, STUDY: XR KNEE RIGHT 1-2 VIEWS 10/16/2024 12:35 pm   INDICATION: Signs/Symptoms:Post op knee   COMPARISON: None available.   ACCESSION NUMBER(S): [id number]   ORDERING CLINICIAN: LAURA SANCHES   TECHNIQUE: 2 portable views of right knee are completed.   FINDINGS: There is postoperative change from knee arthroplasty with femoral and tibial components anatomically aligned. No acute bony abnormality is seen.       Satisfactory appearance of right knee arthroplasty.   Signed by: Amirah Sapp 10/16/2024 12:48 PM Dictation workstation:   [id number]          Assessment/Plan          Assessment & Plan  Primary osteoarthritis of right knee  Patient status post right knee arthroplasty, clinically stable  -Managed per ortho  -Continue and reassess pain control  -Supportive care   -Discharge with physical therapy referral   HTN (hypertension)  -Continue furosemide and lisinopril   Gastroesophageal reflux disease  -Continue lansoprazole   CAD (coronary artery disease)  -Continue management with cardiology   -Continue aspirin     DVT prophylaxis  -Continue aspirin  -SCDs  -Early ambulation with physical therapy     Follow up with primary care in 1-2 weeks.             PENNY LONG

## 2025-08-12 PROBLEM — M54.9 CHRONIC BACK PAIN: Status: ACTIVE | Noted: 2025-08-12

## 2025-08-12 PROBLEM — M25.512 SHOULDER PAIN, BILATERAL: Status: ACTIVE | Noted: 2025-08-12

## 2025-08-12 PROBLEM — T88.59XA DELAYED EMERGENCE FROM ANESTHESIA: Status: RESOLVED | Noted: 2024-10-16 | Resolved: 2025-08-12

## 2025-08-12 PROBLEM — R06.00 DYSPNEA: Status: RESOLVED | Noted: 2024-04-24 | Resolved: 2025-08-12

## 2025-08-12 PROBLEM — M51.369 DEGENERATION OF LUMBAR INTERVERTEBRAL DISC: Status: ACTIVE | Noted: 2021-11-15

## 2025-08-12 PROBLEM — M46.1 BILATERAL SACROILIITIS: Chronic | Status: ACTIVE | Noted: 2024-01-05

## 2025-08-12 PROBLEM — G89.4 CHRONIC PAIN SYNDROME: Status: ACTIVE | Noted: 2025-08-12

## 2025-08-12 PROBLEM — M19.90 DJD (DEGENERATIVE JOINT DISEASE): Status: ACTIVE | Noted: 2021-05-28

## 2025-08-12 PROBLEM — R35.0 INCREASED URINARY FREQUENCY: Status: ACTIVE | Noted: 2025-08-12

## 2025-08-12 PROBLEM — M16.11 PRIMARY OSTEOARTHRITIS OF RIGHT HIP: Status: ACTIVE | Noted: 2021-04-28

## 2025-08-12 PROBLEM — M51.26 DISPLACEMENT OF LUMBAR INTERVERTEBRAL DISC WITHOUT MYELOPATHY: Status: ACTIVE | Noted: 2021-11-15

## 2025-08-12 PROBLEM — R22.1 SUBMANDIBULAR SWELLING: Status: RESOLVED | Noted: 2024-02-26 | Resolved: 2025-08-12

## 2025-08-12 PROBLEM — M16.12 PRIMARY OSTEOARTHRITIS OF LEFT HIP: Status: RESOLVED | Noted: 2021-04-28 | Resolved: 2025-08-12

## 2025-08-12 PROBLEM — M16.12 PRIMARY OSTEOARTHRITIS OF LEFT HIP: Status: ACTIVE | Noted: 2021-04-28

## 2025-08-12 PROBLEM — M96.1 POSTLAMINECTOMY SYNDROME, LUMBAR REGION: Status: ACTIVE | Noted: 2021-11-15

## 2025-08-12 PROBLEM — G89.29 CHRONIC BACK PAIN: Status: ACTIVE | Noted: 2025-08-12

## 2025-08-12 PROBLEM — K57.30 DIVERTICULOSIS OF LARGE INTESTINE WITHOUT HEMORRHAGE: Status: ACTIVE | Noted: 2018-08-08

## 2025-08-12 PROBLEM — Z96.642 STATUS POST HIP REPLACEMENT, LEFT: Status: ACTIVE | Noted: 2021-07-29

## 2025-08-12 PROBLEM — F17.200 TOBACCO DEPENDENCE SYNDROME: Status: ACTIVE | Noted: 2025-08-12

## 2025-08-12 PROBLEM — R22.0 SUBMANDIBULAR SWELLING: Status: RESOLVED | Noted: 2024-02-26 | Resolved: 2025-08-12

## 2025-08-12 PROBLEM — M25.511 SHOULDER PAIN, BILATERAL: Status: ACTIVE | Noted: 2025-08-12

## 2025-08-21 ENCOUNTER — OFFICE VISIT (OUTPATIENT)
Dept: PRIMARY CARE | Facility: CLINIC | Age: 70
End: 2025-08-21
Payer: COMMERCIAL

## 2025-08-21 VITALS
OXYGEN SATURATION: 98 % | DIASTOLIC BLOOD PRESSURE: 80 MMHG | HEIGHT: 70 IN | HEART RATE: 75 BPM | WEIGHT: 256.4 LBS | BODY MASS INDEX: 36.71 KG/M2 | SYSTOLIC BLOOD PRESSURE: 132 MMHG

## 2025-08-21 DIAGNOSIS — G44.209 MUSCLE TENSION HEADACHE: Primary | ICD-10-CM

## 2025-08-21 DIAGNOSIS — R33.9 URINARY RETENTION: ICD-10-CM

## 2025-08-21 DIAGNOSIS — R73.03 PREDIABETES: ICD-10-CM

## 2025-08-21 DIAGNOSIS — R82.90 MALODOROUS URINE: ICD-10-CM

## 2025-08-21 PROBLEM — Z96.642 STATUS POST HIP REPLACEMENT, LEFT: Status: RESOLVED | Noted: 2021-07-29 | Resolved: 2025-08-21

## 2025-08-21 LAB
POC APPEARANCE, URINE: CLEAR
POC BILIRUBIN, URINE: NEGATIVE
POC BLOOD, URINE: NEGATIVE
POC COLOR, URINE: YELLOW
POC GLUCOSE, URINE: NEGATIVE MG/DL
POC HEMOGLOBIN A1C: 6.3 % (ref 4.2–6.5)
POC KETONES, URINE: NEGATIVE MG/DL
POC LEUKOCYTES, URINE: ABNORMAL
POC NITRITE,URINE: POSITIVE
POC PH, URINE: 6.5 PH
POC PROTEIN, URINE: NEGATIVE MG/DL
POC SPECIFIC GRAVITY, URINE: 1.02
POC UROBILINOGEN, URINE: 0.2 EU/DL

## 2025-08-21 PROCEDURE — 3075F SYST BP GE 130 - 139MM HG: CPT | Performed by: FAMILY MEDICINE

## 2025-08-21 PROCEDURE — 1159F MED LIST DOCD IN RCRD: CPT | Performed by: FAMILY MEDICINE

## 2025-08-21 PROCEDURE — 3079F DIAST BP 80-89 MM HG: CPT | Performed by: FAMILY MEDICINE

## 2025-08-21 PROCEDURE — 99204 OFFICE O/P NEW MOD 45 MIN: CPT | Performed by: FAMILY MEDICINE

## 2025-08-21 PROCEDURE — 1160F RVW MEDS BY RX/DR IN RCRD: CPT | Performed by: FAMILY MEDICINE

## 2025-08-21 PROCEDURE — 1125F AMNT PAIN NOTED PAIN PRSNT: CPT | Performed by: FAMILY MEDICINE

## 2025-08-21 PROCEDURE — 83036 HEMOGLOBIN GLYCOSYLATED A1C: CPT | Performed by: FAMILY MEDICINE

## 2025-08-21 PROCEDURE — 3008F BODY MASS INDEX DOCD: CPT | Performed by: FAMILY MEDICINE

## 2025-08-21 PROCEDURE — 81003 URINALYSIS AUTO W/O SCOPE: CPT | Performed by: FAMILY MEDICINE

## 2025-08-21 RX ORDER — NITROFURANTOIN 25; 75 MG/1; MG/1
100 CAPSULE ORAL 2 TIMES DAILY
Qty: 14 CAPSULE | Refills: 0 | Status: SHIPPED | OUTPATIENT
Start: 2025-08-21 | End: 2025-08-28

## 2025-08-21 ASSESSMENT — PAIN SCALES - GENERAL: PAINLEVEL_OUTOF10: 6

## 2025-08-21 ASSESSMENT — PATIENT HEALTH QUESTIONNAIRE - PHQ9
1. LITTLE INTEREST OR PLEASURE IN DOING THINGS: NOT AT ALL
SUM OF ALL RESPONSES TO PHQ9 QUESTIONS 1 AND 2: 0
2. FEELING DOWN, DEPRESSED OR HOPELESS: NOT AT ALL

## 2025-08-22 DIAGNOSIS — R82.90 MALODOROUS URINE: ICD-10-CM

## 2025-08-22 RX ORDER — NITROFURANTOIN 25; 75 MG/1; MG/1
100 CAPSULE ORAL 2 TIMES DAILY
Qty: 14 CAPSULE | Refills: 0 | Status: CANCELLED | OUTPATIENT
Start: 2025-08-22 | End: 2025-08-29

## 2025-08-24 ENCOUNTER — RESULTS FOLLOW-UP (OUTPATIENT)
Dept: PRIMARY CARE | Facility: CLINIC | Age: 70
End: 2025-08-24
Payer: COMMERCIAL

## 2025-08-24 DIAGNOSIS — N30.00 ACUTE CYSTITIS WITHOUT HEMATURIA: Primary | ICD-10-CM

## 2025-08-24 LAB — BACTERIA UR CULT: ABNORMAL

## 2025-08-27 RX ORDER — CIPROFLOXACIN 500 MG/1
500 TABLET, FILM COATED ORAL 2 TIMES DAILY
Qty: 14 TABLET | Refills: 0 | Status: SHIPPED | OUTPATIENT
Start: 2025-08-27 | End: 2025-09-03

## 2025-10-15 ENCOUNTER — APPOINTMENT (OUTPATIENT)
Dept: PRIMARY CARE | Facility: CLINIC | Age: 70
End: 2025-10-15
Payer: COMMERCIAL

## (undated) DEVICE — ADHESIVE, SKIN, MASTISOL, 2/3 CC VIAL

## (undated) DEVICE — SUTURE, VICRYL, 1, 36 IN, CT-1, UNDYED

## (undated) DEVICE — IRRIGATION SYSTEM, WOUND, PULSAVAC PLUS

## (undated) DEVICE — NEEDLE, SPINAL, S/SU, 18GA 3IN, QUINCKE, STERILE

## (undated) DEVICE — BLADE, MAKO, SAGITTAL, NARROW

## (undated) DEVICE — 10IN STRYKER SMOKE EVACUATION TUBING

## (undated) DEVICE — CLOSURE DEVICE, VASCULAR, MYNX, 5FR

## (undated) DEVICE — CATHETER, ANGIO, IMPULSE, FL4, 5 FR X 100 CM

## (undated) DEVICE — GLOVE, SURGICAL, BIOGEL, OPTIFIT, SZ 8.0

## (undated) DEVICE — ACCESS SYSTEM, PINNACLE PRECISION, 5FR X 10CM, ECHOGENIC NEEDLE

## (undated) DEVICE — STRIP, SKIN CLOSURE, STERI STRIP, REINFORCED, 0.5 X 4 IN

## (undated) DEVICE — Device

## (undated) DEVICE — SUTURE, VICRYL, 2-0, 18 IN CP-2, UNDYED

## (undated) DEVICE — SOLUTION, IRRIGATION, STERILE WATER, 1000 ML, POUR BOTTLE

## (undated) DEVICE — CHECKPOINT KIT, FEMORAL/ TIBIAL

## (undated) DEVICE — 2.4 X 3.1" (6X8CM) MEPILEX BORDER POST-OP AG ANTIMICROBIAL DRESSING"

## (undated) DEVICE — 5FR DXTERITY 3DRC DIAGNOSTIC CATHETER, .038 100 CM RADIAL

## (undated) DEVICE — HOOD, SURGICAL, FLYTE HYBRID

## (undated) DEVICE — CUFF, TOURNIQUET, 30 X 4, DUAL PORT/SNGL BLADDER, DISP, LF

## (undated) DEVICE — TIP, SUCTION, YANKAUER, FLEXIBLE

## (undated) DEVICE — BLADE, SAW, SAGITTAL, 25 X 73 X 1.24MM, SS, STERILE

## (undated) DEVICE — SYRINGE, 50 CC, LUER LOCK

## (undated) DEVICE — SOLUTION, INJECTION, USP, LACTATED RINGERS, LIFECARE, 1000ML

## (undated) DEVICE — SUTURE, FIBERWIRE 2, T-5 TAPER NEEDLE, 38"

## (undated) DEVICE — MANIFOLD KIT, CUSTOM, GEAUGA

## (undated) DEVICE — DRESSING, MEPILEX BORDER, POST-OP AG, 4 X 10 IN

## (undated) DEVICE — TRACKING KIT, TM KNEE PROCEDURES, VIZADISC

## (undated) DEVICE — WOUND SYSTEM, DEBRIDEMENT & CLEANING, O.R DUOPAK

## (undated) DEVICE — TRAY, DRY PREP, PREMIUM

## (undated) DEVICE — SOLUTION, CHLORHEXIDINE, 4%, 4OZ

## (undated) DEVICE — PROTECTIVE, FOOT, FOAM PAD & COHESIVE WRAP, STERILE

## (undated) DEVICE — ANGIO KIT, LEFT HEART, LF, CUSTOM